# Patient Record
Sex: MALE | Race: WHITE | Employment: UNEMPLOYED | RURAL
[De-identification: names, ages, dates, MRNs, and addresses within clinical notes are randomized per-mention and may not be internally consistent; named-entity substitution may affect disease eponyms.]

---

## 2019-07-25 ENCOUNTER — OFFICE VISIT (OUTPATIENT)
Dept: FAMILY MEDICINE CLINIC | Age: 69
End: 2019-07-25

## 2019-07-25 VITALS
HEART RATE: 57 BPM | RESPIRATION RATE: 16 BRPM | SYSTOLIC BLOOD PRESSURE: 162 MMHG | TEMPERATURE: 98.2 F | OXYGEN SATURATION: 94 % | WEIGHT: 231 LBS | DIASTOLIC BLOOD PRESSURE: 106 MMHG | BODY MASS INDEX: 31.29 KG/M2 | HEIGHT: 72 IN

## 2019-07-25 DIAGNOSIS — E78.2 MIXED HYPERLIPIDEMIA: ICD-10-CM

## 2019-07-25 DIAGNOSIS — I10 ESSENTIAL HYPERTENSION: Primary | ICD-10-CM

## 2019-07-25 DIAGNOSIS — R35.1 NOCTURIA: ICD-10-CM

## 2019-07-25 RX ORDER — RANITIDINE 150 MG/1
150 TABLET, FILM COATED ORAL 2 TIMES DAILY
COMMUNITY

## 2019-07-25 NOTE — PATIENT INSTRUCTIONS
Home Blood Pressure Test: About This Test  What is it? A home blood pressure test allows you to keep track of your blood pressure at home. Blood pressure is a measure of the force of blood against the walls of your arteries. Blood pressure readings include two numbers, such as 130/80 (say \"130 over 80\"). The first number is the systolic pressure. The second number is the diastolic pressure. Why is this test done? You may do this test at home to:  · Find out if you have high blood pressure. · Track your blood pressure if you have high blood pressure. · Track how well medicine is working to reduce high blood pressure. · Check how lifestyle changes, such as weight loss and exercise, are affecting blood pressure. How can you prepare for the test?  · Do not use caffeine, tobacco, or medicines known to raise blood pressure (such as nasal decongestant sprays) for at least 30 minutes before taking your blood pressure. · Do not exercise for at least 30 minutes before taking your blood pressure. What happens before the test?  Take your blood pressure while you feel comfortable and relaxed. Sit quietly with both feet on the floor for at least 5 minutes before the test.  What happens during the test?  · Sit with your arm slightly bent and resting on a table so that your upper arm is at the same level as your heart. · Roll up your sleeve or take off your shirt to expose your upper arm. · Wrap the blood pressure cuff around your upper arm so that the lower edge of the cuff is about 1 inch above the bend of your elbow. Proceed with the following steps depending on if you are using an automatic or manual pressure monitor. Automatic blood pressure monitors  · Press the on/off button on the automatic monitor and wait until the ready-to-measure \"heart\" symbol appears next to zero in the display window. · Press the start button. The cuff will inflate and deflate by itself.   · Your blood pressure numbers will appear on the screen. · Write your numbers in your log book, along with the date and time. Manual blood pressure monitors  · Place the earpieces of a stethoscope in your ears, and place the bell of the stethoscope over the artery, just below the cuff. · Close the valve on the rubber inflating bulb. · Squeeze the bulb rapidly with your opposite hand to inflate the cuff until the dial or column of mercury reads about 30 mm Hg higher than your usual systolic pressure. If you do not know your usual pressure, inflate the cuff to 210 mm Hg or until the pulse at your wrist disappears. · Open the pressure valve just slightly by twisting or pressing the valve on the bulb. · As you watch the pressure slowly fall, note the level on the dial at which you first start to hear a pulsing or tapping sound through the stethoscope. This is your systolic blood pressure. · Continue letting the air out slowly. The sounds will become muffled and will finally disappear. Note the pressure when the sounds completely disappear. This is your diastolic blood pressure. Let out all the remaining air. · Write your numbers in your log book, along with the date and time. What else should you know about the test?  It is more accurate to take the average of several readings made throughout the day than to rely on a single reading. It's normal for blood pressure to go up and down throughout the day. Follow-up care is a key part of your treatment and safety. Be sure to make and go to all appointments, and call your doctor if you are having problems. It's also a good idea to keep a list of the medicines you take. Where can you learn more? Go to http://bijan-miller.info/. Enter C427 in the search box to learn more about \"Home Blood Pressure Test: About This Test.\"  Current as of: July 22, 2018  Content Version: 12.1  © 0186-0548 [employer], [employer].  Care instructions adapted under license by [employer] (which disclaims liability or warranty for this information). If you have questions about a medical condition or this instruction, always ask your healthcare professional. [address] any warranty or liability for your use of this information.

## 2019-07-25 NOTE — PROGRESS NOTES
Patient: Nirav Samuels MRN: [id number]  SSN: [id number]    YOB: 1950  Age: 81 y.o. Sex: male        Subjective:     Chief Complaint   Patient presents with    Establish Care    Sinus Pain     with ear pain    Dizziness       HPI: he is a 81y.o. year old male who presents with wife to establish care. He had been seeing his prior PCP  for annual exams and acute problems. Last OV with PCP 1 year ago. Patient concerned about dizziness with sinus and ear pain. BP uncontrolled. Per wife today's readings are typical for him. Patient on atenolol 50 mg BID. Review of prior OV notes indicate intolerance of other BP medications. Cholesterol uncontrolled. 10 year CV risk calculated at 40%. He has not been on medication for HLD. Patient says he had cardiac testing in [address] [place] [address] and \"came through with flying colors\" and is adamant about his good health. Encounter Diagnoses   Name Primary?  Essential hypertension Yes    Mixed hyperlipidemia     Nocturia        BP Readings from Last 3 Encounters:   07/25/19 (!) 162/106   08/30/18 160/85   06/19/18 138/70       Wt Readings from Last 3 Encounters:   07/25/19 231 lb (104.8 kg)   08/30/18 218 lb (98.9 kg)   06/19/18 223 lb (101.2 kg)     Body mass index is 31.77 kg/m². Current and past medical information:    Current Medications after this visit[de-identified]     Current Outpatient Medications   Medication Sig    raNITIdine (ZANTAC) 150 mg tablet Take 150 mg by mouth two (2) times a day. PRN    atenolol (TENORMIN) 50 mg tablet TAKE 1 TABLET TWICE DAILY     No current facility-administered medications for this visit.         Patient Active Problem List    Diagnosis Date Noted    Essential hypertension 12/08/2015    Asthma        Past Medical History:   Diagnosis Date    Asthma     Contact dermatitis and other eczema, due to unspecified cause     HTN (hypertension) 2/15/2015       Allergies   Allergen Reactions    Prednisone Anaphylaxis       History reviewed. No pertinent surgical history. Social History     Socioeconomic History    Marital status:      Spouse name: Not on file    Number of children: Not on file    Years of education: Not on file    Highest education level: Not on file   Tobacco Use    Smoking status: Former Smoker    Smokeless tobacco: Never Used   Substance and Sexual Activity    Alcohol use: No    Drug use: No         Objective:     Review of Systems:  Constitutional: Negative for fatigue or malaise  Derm: Negative for rash or lesion  HEENT: see HPI  Cardiovascular: Negative for dizziness, chest pain or palpitations  Respiratory: Negative for cough, wheezing or SOB  Gastrointestinal: hx of GERD, Negative for nausea or abdominal pain  Genital/urinary: Negative for dysuria or voiding dysfunction  Musculoskeletal: Negative for acute myalgias or arthralgias   Neurological: Negative for headache, weakness or paresthesia  Psychological: Negative for depression or anxiety      Vitals:    07/25/19 1323 07/25/19 1342   BP: (!) 170/95 (!) 162/106   Pulse: (!) 51 (!) 57   Resp: 16    Temp: 98.2 °F (36.8 °C)    TempSrc: Oral    SpO2: 94%    Weight: 231 lb (104.8 kg)    Height: 5' 11.5\" (1.816 m)       Body mass index is 31.77 kg/m². Physical Exam:  Constitutional: well developed, well nourished, in no acute distress  Skin: warm and dry, normal tone and turgor  Head: normocephalic, atraumatic  Eyes: sclera clear, EOMI  Neck: normal range of motion  Cardiovascular: normal S1, S2, regular rate and rhythm  Respiratory: clear to auscultation bilaterally with symmetrical effort  Abdomen: soft, BS normal  Extremities: full range of motion  Neurology: no focal deficits  Psych: active, alert and oriented, affect appropriate       Assessment and orders:       ICD-10-CM ICD-9-CM    1. Essential hypertension N81 365.9 METABOLIC PANEL, COMPREHENSIVE      TSH 3RD GENERATION   2.  Mixed hyperlipidemia E78.2 272.2 LIPID PANEL      METABOLIC PANEL, COMPREHENSIVE   3. Nocturia R35.1 788.43 PSA, DIAGNOSTIC (PROSTATE SPECIFIC AG)         Plan of care:  Diagnoses were discussed in detail with patient. Patient advised to log blood pressures at home daily and bring to office in 2-4 weeks. Call office as soon as possible if BP's over 140/90 on multiple occasions or with symptoms of dizziness, chest pain, shortness of breath, headache or ankle swelling. Our goal is to normalize the blood pressure to decrease the risks of strokes and heart attacks. Medication risks/benefits/side effects discussed with patient. All of the patient's questions were addressed and answered to apparent satisfaction. The patient understands and agrees with our plan of care. The patient knows to call back if they have questions about the plan of care or if symptoms change. The patient received an After-Visit Summary which contains VS, diagnoses, orders, allergy and medication lists. Patient Care Team:  Colt Hou MD as PCP - [facility])    Follow-up and Dispositions    · Return in about 6 months (around 1/25/2020), or if symptoms worsen or fail to improve. No future appointments.     Signed By: Zoila Garcia MD     July 29, 2019

## 2019-07-25 NOTE — PROGRESS NOTES
1. Have you been to the ER, urgent care clinic since your last visit? Hospitalized since your last visit? No    2. Have you seen or consulted any other health care providers outside of the [address] [place] [address] since your last visit? Include any pap smears or colon screening.  No  Reviewed record in preparation for visit and have necessary documentation  Pt did not bring medication to office visit for review    Goals that were addressed and/or need to be completed during or after this appointment include     Health Maintenance Due   Topic Date Due    DTaP/Tdap/Td series (1 - Tdap) 07/17/1973    Shingrix Vaccine Age 50> (1 of 2) 07/17/2002    GLAUCOMA SCREENING Q2Y  07/17/2017    Pneumococcal 65+ years (1 of 2 - PCV13) 07/17/2017

## 2019-08-01 DIAGNOSIS — R35.1 NOCTURIA: ICD-10-CM

## 2019-08-01 DIAGNOSIS — I10 ESSENTIAL HYPERTENSION: ICD-10-CM

## 2019-08-01 DIAGNOSIS — E78.2 MIXED HYPERLIPIDEMIA: ICD-10-CM

## 2019-08-27 LAB
ALBUMIN SERPL-MCNC: 4.4 G/DL (ref 3.6–4.8)
ALBUMIN/GLOB SERPL: 1.8 {RATIO} (ref 1.2–2.2)
ALP SERPL-CCNC: 62 IU/L (ref 39–117)
ALT SERPL-CCNC: 23 IU/L (ref 0–44)
AST SERPL-CCNC: 20 IU/L (ref 0–40)
BILIRUB SERPL-MCNC: 0.9 MG/DL (ref 0–1.2)
BUN SERPL-MCNC: 15 MG/DL (ref 8–27)
BUN/CREAT SERPL: 14 (ref 10–24)
CALCIUM SERPL-MCNC: 9.4 MG/DL (ref 8.6–10.2)
CHLORIDE SERPL-SCNC: 101 MMOL/L (ref 96–106)
CHOLEST SERPL-MCNC: 221 MG/DL (ref 100–199)
CO2 SERPL-SCNC: 25 MMOL/L (ref 20–29)
CREAT SERPL-MCNC: 1.09 MG/DL (ref 0.76–1.27)
GLOBULIN SER CALC-MCNC: 2.5 G/DL (ref 1.5–4.5)
GLUCOSE SERPL-MCNC: 113 MG/DL (ref 65–99)
HDLC SERPL-MCNC: 48 MG/DL
LDLC SERPL CALC-MCNC: 148 MG/DL (ref 0–99)
POTASSIUM SERPL-SCNC: 3.8 MMOL/L (ref 3.5–5.2)
PROT SERPL-MCNC: 6.9 G/DL (ref 6–8.5)
PSA SERPL-MCNC: 1 NG/ML (ref 0–4)
SODIUM SERPL-SCNC: 142 MMOL/L (ref 134–144)
TRIGL SERPL-MCNC: 127 MG/DL (ref 0–149)
TSH SERPL DL<=0.005 MIU/L-ACNC: 4.2 UIU/ML (ref 0.45–4.5)
VLDLC SERPL CALC-MCNC: 25 MG/DL (ref 5–40)

## 2020-07-14 ENCOUNTER — HOSPITAL ENCOUNTER (INPATIENT)
Age: 70
LOS: 2 days | Discharge: LEFT BEFORE DISCHARGE | DRG: 065 | End: 2020-07-16
Attending: ANESTHESIOLOGY | Admitting: ANESTHESIOLOGY
Payer: MEDICARE

## 2020-07-14 ENCOUNTER — APPOINTMENT (OUTPATIENT)
Dept: CT IMAGING | Age: 70
DRG: 065 | End: 2020-07-14
Payer: MEDICARE

## 2020-07-14 DIAGNOSIS — I61.8 OTHER LEFT-SIDED NONTRAUMATIC INTRACEREBRAL HEMORRHAGE (HCC): ICD-10-CM

## 2020-07-14 DIAGNOSIS — I62.01 NONTRAUMATIC ACUTE SUBDURAL HEMORRHAGE (HCC): ICD-10-CM

## 2020-07-14 DIAGNOSIS — D75.839 THROMBOCYTOSIS: ICD-10-CM

## 2020-07-14 DIAGNOSIS — I10 ESSENTIAL HYPERTENSION: ICD-10-CM

## 2020-07-14 PROBLEM — I61.9 ICH (INTRACEREBRAL HEMORRHAGE) (HCC): Status: ACTIVE | Noted: 2020-07-14

## 2020-07-14 LAB
APTT PPP: 30.4 SEC (ref 22.1–32)
INR PPP: 1.4 (ref 0.9–1.1)
PROTHROMBIN TIME: 13.9 SEC (ref 9–11.1)
THERAPEUTIC RANGE,PTTT: NORMAL SECS (ref 58–77)

## 2020-07-14 PROCEDURE — 85730 THROMBOPLASTIN TIME PARTIAL: CPT

## 2020-07-14 PROCEDURE — 70498 CT ANGIOGRAPHY NECK: CPT

## 2020-07-14 PROCEDURE — 83735 ASSAY OF MAGNESIUM: CPT

## 2020-07-14 PROCEDURE — 74011000250 HC RX REV CODE- 250: Performed by: NURSE PRACTITIONER

## 2020-07-14 PROCEDURE — 80061 LIPID PANEL: CPT

## 2020-07-14 PROCEDURE — 74011000258 HC RX REV CODE- 258: Performed by: RADIOLOGY

## 2020-07-14 PROCEDURE — 74011636320 HC RX REV CODE- 636/320: Performed by: RADIOLOGY

## 2020-07-14 PROCEDURE — 84100 ASSAY OF PHOSPHORUS: CPT

## 2020-07-14 PROCEDURE — 70496 CT ANGIOGRAPHY HEAD: CPT

## 2020-07-14 PROCEDURE — 85025 COMPLETE CBC W/AUTO DIFF WBC: CPT

## 2020-07-14 PROCEDURE — 85610 PROTHROMBIN TIME: CPT

## 2020-07-14 PROCEDURE — 36415 COLL VENOUS BLD VENIPUNCTURE: CPT

## 2020-07-14 PROCEDURE — 70450 CT HEAD/BRAIN W/O DYE: CPT

## 2020-07-14 PROCEDURE — 74011250636 HC RX REV CODE- 250/636: Performed by: NURSE PRACTITIONER

## 2020-07-14 PROCEDURE — 65610000006 HC RM INTENSIVE CARE

## 2020-07-14 PROCEDURE — 80053 COMPREHEN METABOLIC PANEL: CPT

## 2020-07-14 PROCEDURE — 82728 ASSAY OF FERRITIN: CPT

## 2020-07-14 RX ORDER — SODIUM CHLORIDE 0.9 % (FLUSH) 0.9 %
5-40 SYRINGE (ML) INJECTION AS NEEDED
Status: DISCONTINUED | OUTPATIENT
Start: 2020-07-14 | End: 2020-07-16 | Stop reason: HOSPADM

## 2020-07-14 RX ORDER — ACETAMINOPHEN 325 MG/1
650 TABLET ORAL
Status: DISCONTINUED | OUTPATIENT
Start: 2020-07-14 | End: 2020-07-16 | Stop reason: HOSPADM

## 2020-07-14 RX ORDER — HYDRALAZINE HYDROCHLORIDE 20 MG/ML
10 INJECTION INTRAMUSCULAR; INTRAVENOUS
Status: DISCONTINUED | OUTPATIENT
Start: 2020-07-14 | End: 2020-07-15

## 2020-07-14 RX ORDER — SODIUM CHLORIDE 0.9 % (FLUSH) 0.9 %
5-40 SYRINGE (ML) INJECTION EVERY 8 HOURS
Status: DISCONTINUED | OUTPATIENT
Start: 2020-07-14 | End: 2020-07-16 | Stop reason: HOSPADM

## 2020-07-14 RX ORDER — SODIUM CHLORIDE 0.9 % (FLUSH) 0.9 %
10 SYRINGE (ML) INJECTION
Status: COMPLETED | OUTPATIENT
Start: 2020-07-14 | End: 2020-07-14

## 2020-07-14 RX ORDER — ACETAMINOPHEN 650 MG/1
650 SUPPOSITORY RECTAL
Status: DISCONTINUED | OUTPATIENT
Start: 2020-07-14 | End: 2020-07-16 | Stop reason: HOSPADM

## 2020-07-14 RX ORDER — LABETALOL HYDROCHLORIDE 5 MG/ML
10 INJECTION, SOLUTION INTRAVENOUS
Status: DISCONTINUED | OUTPATIENT
Start: 2020-07-14 | End: 2020-07-15

## 2020-07-14 RX ORDER — SODIUM CHLORIDE 9 MG/ML
75 INJECTION, SOLUTION INTRAVENOUS CONTINUOUS
Status: DISCONTINUED | OUTPATIENT
Start: 2020-07-14 | End: 2020-07-16 | Stop reason: HOSPADM

## 2020-07-14 RX ORDER — ONDANSETRON 2 MG/ML
4 INJECTION INTRAMUSCULAR; INTRAVENOUS
Status: DISCONTINUED | OUTPATIENT
Start: 2020-07-14 | End: 2020-07-16 | Stop reason: HOSPADM

## 2020-07-14 RX ADMIN — SODIUM CHLORIDE 7 MG/HR: 900 INJECTION, SOLUTION INTRAVENOUS at 20:50

## 2020-07-14 RX ADMIN — IOPAMIDOL 100 ML: 755 INJECTION, SOLUTION INTRAVENOUS at 21:40

## 2020-07-14 RX ADMIN — SODIUM CHLORIDE 100 ML: 900 INJECTION, SOLUTION INTRAVENOUS at 21:41

## 2020-07-14 RX ADMIN — Medication 10 ML: at 21:10

## 2020-07-14 RX ADMIN — SODIUM CHLORIDE 75 ML/HR: 900 INJECTION, SOLUTION INTRAVENOUS at 21:08

## 2020-07-14 RX ADMIN — Medication 10 ML: at 21:41

## 2020-07-14 NOTE — ROUTINE PROCESS
TRANSFER - IN REPORT:    Verbal report received from Enedelia Garcia RN on Sophie Sen  being received from [facility] for urgent transfer      Report consisted of patients Situation, Background, Assessment and   Recommendations(SBAR). Information from the following report(s) SBAR, Kardex, Recent Results and Cardiac Rhythm NSR was reviewed with the receiving nurse. Opportunity for questions and clarification was provided. Assessment completed upon patients arrival to unit and care assumed. 1956: Patient arrive to unit. Cardene gtt infusing 7 mg/hr. VSS, A&O x4, neuro intact, moves extremities x4, PERRL. 2000: Bedside and Verbal shift change report given to Judy Escobedo RN (oncoming nurse) by Wesly Beck RN (offgoing nurse). Report included the following information SBAR, Kardex, Intake/Output, MAR, Recent Results and Cardiac Rhythm NSR.

## 2020-07-15 ENCOUNTER — APPOINTMENT (OUTPATIENT)
Dept: INTERVENTIONAL RADIOLOGY/VASCULAR | Age: 70
DRG: 065 | End: 2020-07-15
Attending: STUDENT IN AN ORGANIZED HEALTH CARE EDUCATION/TRAINING PROGRAM
Payer: MEDICARE

## 2020-07-15 ENCOUNTER — APPOINTMENT (OUTPATIENT)
Dept: ULTRASOUND IMAGING | Age: 70
DRG: 065 | End: 2020-07-15
Attending: INTERNAL MEDICINE
Payer: MEDICARE

## 2020-07-15 ENCOUNTER — APPOINTMENT (OUTPATIENT)
Dept: MRI IMAGING | Age: 70
DRG: 065 | End: 2020-07-15
Attending: NURSE PRACTITIONER
Payer: MEDICARE

## 2020-07-15 PROBLEM — I62.01 NONTRAUMATIC ACUTE SUBDURAL HEMORRHAGE (HCC): Status: ACTIVE | Noted: 2020-07-14

## 2020-07-15 LAB
ALBUMIN SERPL-MCNC: 4 G/DL (ref 3.5–5)
ALBUMIN/GLOB SERPL: 1.3 {RATIO} (ref 1.1–2.2)
ALP SERPL-CCNC: 63 U/L (ref 45–117)
ALT SERPL-CCNC: 36 U/L (ref 12–78)
ANION GAP SERPL CALC-SCNC: 7 MMOL/L (ref 5–15)
ANION GAP SERPL CALC-SCNC: 8 MMOL/L (ref 5–15)
AST SERPL-CCNC: 16 U/L (ref 15–37)
BASOPHILS # BLD: 1.6 K/UL (ref 0–0.1)
BASOPHILS # BLD: 3.1 K/UL (ref 0–0.1)
BASOPHILS NFR BLD: 5 % (ref 0–1)
BASOPHILS NFR BLD: 9 % (ref 0–1)
BILIRUB SERPL-MCNC: 0.5 MG/DL (ref 0.2–1)
BUN SERPL-MCNC: 15 MG/DL (ref 6–20)
BUN SERPL-MCNC: 18 MG/DL (ref 6–20)
BUN/CREAT SERPL: 15 (ref 12–20)
BUN/CREAT SERPL: 16 (ref 12–20)
CALCIUM SERPL-MCNC: 8.4 MG/DL (ref 8.5–10.1)
CALCIUM SERPL-MCNC: 8.5 MG/DL (ref 8.5–10.1)
CHLORIDE SERPL-SCNC: 106 MMOL/L (ref 97–108)
CHLORIDE SERPL-SCNC: 109 MMOL/L (ref 97–108)
CHOLEST SERPL-MCNC: 182 MG/DL
CO2 SERPL-SCNC: 23 MMOL/L (ref 21–32)
CO2 SERPL-SCNC: 26 MMOL/L (ref 21–32)
COMMENT, HOLDF: NORMAL
CREAT SERPL-MCNC: 0.91 MG/DL (ref 0.7–1.3)
CREAT SERPL-MCNC: 1.18 MG/DL (ref 0.7–1.3)
DIFFERENTIAL METHOD BLD: ABNORMAL
DIFFERENTIAL METHOD BLD: ABNORMAL
EOSINOPHIL # BLD: 0.3 K/UL (ref 0–0.4)
EOSINOPHIL # BLD: 1 K/UL (ref 0–0.4)
EOSINOPHIL NFR BLD: 1 % (ref 0–7)
EOSINOPHIL NFR BLD: 3 % (ref 0–7)
ERYTHROCYTE [DISTWIDTH] IN BLOOD BY AUTOMATED COUNT: 14.9 % (ref 11.5–14.5)
ERYTHROCYTE [DISTWIDTH] IN BLOOD BY AUTOMATED COUNT: 15.1 % (ref 11.5–14.5)
FACT VIII ACT/NOR PPP: 109 % (ref 80–200)
FERRITIN SERPL-MCNC: 422 NG/ML (ref 26–388)
FERRITIN SERPL-MCNC: 435 NG/ML (ref 26–388)
GLOBULIN SER CALC-MCNC: 3 G/DL (ref 2–4)
GLUCOSE SERPL-MCNC: 108 MG/DL (ref 65–100)
GLUCOSE SERPL-MCNC: 121 MG/DL (ref 65–100)
HCT VFR BLD AUTO: 38.5 % (ref 36.6–50.3)
HCT VFR BLD AUTO: 40.4 % (ref 36.6–50.3)
HDLC SERPL-MCNC: 33 MG/DL
HDLC SERPL: 5.5 {RATIO} (ref 0–5)
HGB BLD-MCNC: 12.8 G/DL (ref 12.1–17)
HGB BLD-MCNC: 13.3 G/DL (ref 12.1–17)
IMM GRANULOCYTES # BLD AUTO: 0 K/UL
IMM GRANULOCYTES # BLD AUTO: 0 K/UL
IMM GRANULOCYTES NFR BLD AUTO: 0 %
IMM GRANULOCYTES NFR BLD AUTO: 0 %
IRON SATN MFR SERPL: 56 % (ref 20–50)
IRON SERPL-MCNC: 130 UG/DL (ref 35–150)
LDH SERPL L TO P-CCNC: 300 U/L (ref 85–241)
LDLC SERPL CALC-MCNC: 106.6 MG/DL (ref 0–100)
LIPID PROFILE,FLP: ABNORMAL
LYMPHOCYTES # BLD: 2.6 K/UL (ref 0.8–3.5)
LYMPHOCYTES # BLD: 3.4 K/UL (ref 0.8–3.5)
LYMPHOCYTES NFR BLD: 10 % (ref 12–49)
LYMPHOCYTES NFR BLD: 8 % (ref 12–49)
MAGNESIUM SERPL-MCNC: 2 MG/DL (ref 1.6–2.4)
MAGNESIUM SERPL-MCNC: 2 MG/DL (ref 1.6–2.4)
MCH RBC QN AUTO: 29 PG (ref 26–34)
MCH RBC QN AUTO: 29 PG (ref 26–34)
MCHC RBC AUTO-ENTMCNC: 32.9 G/DL (ref 30–36.5)
MCHC RBC AUTO-ENTMCNC: 33.2 G/DL (ref 30–36.5)
MCV RBC AUTO: 87.1 FL (ref 80–99)
MCV RBC AUTO: 88 FL (ref 80–99)
METAMYELOCYTES NFR BLD MANUAL: 1 %
MONOCYTES # BLD: 1.6 K/UL (ref 0–1)
MONOCYTES # BLD: 1.7 K/UL (ref 0–1)
MONOCYTES NFR BLD: 5 % (ref 5–13)
MONOCYTES NFR BLD: 5 % (ref 5–13)
MYELOCYTES NFR BLD MANUAL: 1 %
MYELOCYTES NFR BLD MANUAL: 4 %
NEUTS BAND NFR BLD MANUAL: 4 % (ref 0–6)
NEUTS BAND NFR BLD MANUAL: 6 % (ref 0–6)
NEUTS SEG # BLD: 24.1 K/UL (ref 1.8–8)
NEUTS SEG # BLD: 25 K/UL (ref 1.8–8)
NEUTS SEG NFR BLD: 65 % (ref 32–75)
NEUTS SEG NFR BLD: 73 % (ref 32–75)
NRBC # BLD: 0 K/UL (ref 0–0.01)
NRBC # BLD: 0 K/UL (ref 0–0.01)
NRBC BLD-RTO: 0 PER 100 WBC
NRBC BLD-RTO: 0 PER 100 WBC
PATH REV BLD -IMP: ABNORMAL
PERIPHERAL SMEAR,PSM: NORMAL
PHOSPHATE SERPL-MCNC: 3 MG/DL (ref 2.6–4.7)
PHOSPHATE SERPL-MCNC: 3.6 MG/DL (ref 2.6–4.7)
PLATELET # BLD AUTO: 1517 K/UL (ref 150–400)
PLATELET # BLD AUTO: 1547 K/UL (ref 150–400)
PLATELET COMMENTS,PCOM: ABNORMAL
PLATELET COMMENTS,PCOM: ABNORMAL
PMV BLD AUTO: 10.3 FL (ref 8.9–12.9)
PMV BLD AUTO: 10.5 FL (ref 8.9–12.9)
POTASSIUM SERPL-SCNC: 3.3 MMOL/L (ref 3.5–5.1)
POTASSIUM SERPL-SCNC: 3.7 MMOL/L (ref 3.5–5.1)
PROT SERPL-MCNC: 7 G/DL (ref 6.4–8.2)
RBC # BLD AUTO: 4.42 M/UL (ref 4.1–5.7)
RBC # BLD AUTO: 4.59 M/UL (ref 4.1–5.7)
RBC MORPH BLD: ABNORMAL
RBC MORPH BLD: ABNORMAL
SAMPLES BEING HELD,HOLD: NORMAL
SODIUM SERPL-SCNC: 139 MMOL/L (ref 136–145)
SODIUM SERPL-SCNC: 140 MMOL/L (ref 136–145)
TIBC SERPL-MCNC: 232 UG/DL (ref 250–450)
TRIGL SERPL-MCNC: 212 MG/DL (ref ?–150)
VLDLC SERPL CALC-MCNC: 42.4 MG/DL
WBC # BLD AUTO: 32.5 K/UL (ref 4.1–11.1)
WBC # BLD AUTO: 33.9 K/UL (ref 4.1–11.1)

## 2020-07-15 PROCEDURE — 74011636320 HC RX REV CODE- 636/320: Performed by: STUDENT IN AN ORGANIZED HEALTH CARE EDUCATION/TRAINING PROGRAM

## 2020-07-15 PROCEDURE — 74011250636 HC RX REV CODE- 250/636: Performed by: NURSE PRACTITIONER

## 2020-07-15 PROCEDURE — A9585 GADOBUTROL INJECTION: HCPCS | Performed by: ANESTHESIOLOGY

## 2020-07-15 PROCEDURE — 81479 UNLISTED MOLECULAR PATHOLOGY: CPT

## 2020-07-15 PROCEDURE — 70553 MRI BRAIN STEM W/O & W/DYE: CPT

## 2020-07-15 PROCEDURE — 73060999999 HC MISC LAB CHARGE

## 2020-07-15 PROCEDURE — B31F1ZZ FLUOROSCOPY OF LEFT VERTEBRAL ARTERY USING LOW OSMOLAR CONTRAST: ICD-10-PCS | Performed by: STUDENT IN AN ORGANIZED HEALTH CARE EDUCATION/TRAINING PROGRAM

## 2020-07-15 PROCEDURE — 85246 CLOT FACTOR VIII VW ANTIGEN: CPT

## 2020-07-15 PROCEDURE — C1769 GUIDE WIRE: HCPCS

## 2020-07-15 PROCEDURE — 83615 LACTATE (LD) (LDH) ENZYME: CPT

## 2020-07-15 PROCEDURE — 85240 CLOT FACTOR VIII AHG 1 STAGE: CPT

## 2020-07-15 PROCEDURE — B3151ZZ FLUOROSCOPY OF BILATERAL COMMON CAROTID ARTERIES USING LOW OSMOLAR CONTRAST: ICD-10-PCS | Performed by: STUDENT IN AN ORGANIZED HEALTH CARE EDUCATION/TRAINING PROGRAM

## 2020-07-15 PROCEDURE — 76937 US GUIDE VASCULAR ACCESS: CPT

## 2020-07-15 PROCEDURE — 74011250636 HC RX REV CODE- 250/636: Performed by: ANESTHESIOLOGY

## 2020-07-15 PROCEDURE — B3171ZZ FLUOROSCOPY OF LEFT INTERNAL CAROTID ARTERY USING LOW OSMOLAR CONTRAST: ICD-10-PCS | Performed by: STUDENT IN AN ORGANIZED HEALTH CARE EDUCATION/TRAINING PROGRAM

## 2020-07-15 PROCEDURE — 82728 ASSAY OF FERRITIN: CPT

## 2020-07-15 PROCEDURE — 81270 JAK2 GENE: CPT

## 2020-07-15 PROCEDURE — 83735 ASSAY OF MAGNESIUM: CPT

## 2020-07-15 PROCEDURE — 76700 US EXAM ABDOM COMPLETE: CPT

## 2020-07-15 PROCEDURE — B31B1ZZ FLUOROSCOPY OF LEFT EXTERNAL CAROTID ARTERY USING LOW OSMOLAR CONTRAST: ICD-10-PCS | Performed by: STUDENT IN AN ORGANIZED HEALTH CARE EDUCATION/TRAINING PROGRAM

## 2020-07-15 PROCEDURE — 81206 BCR/ABL1 GENE MAJOR BP: CPT

## 2020-07-15 PROCEDURE — 80048 BASIC METABOLIC PNL TOTAL CA: CPT

## 2020-07-15 PROCEDURE — 83540 ASSAY OF IRON: CPT

## 2020-07-15 PROCEDURE — 85245 CLOT FACTOR VIII VW RISTOCTN: CPT

## 2020-07-15 PROCEDURE — 74011250636 HC RX REV CODE- 250/636

## 2020-07-15 PROCEDURE — 99255 IP/OBS CONSLTJ NEW/EST HI 80: CPT | Performed by: STUDENT IN AN ORGANIZED HEALTH CARE EDUCATION/TRAINING PROGRAM

## 2020-07-15 PROCEDURE — 85025 COMPLETE CBC W/AUTO DIFF WBC: CPT

## 2020-07-15 PROCEDURE — 77030008584 HC TOOL GDWRE DEV TERU -A

## 2020-07-15 PROCEDURE — 74011000258 HC RX REV CODE- 258: Performed by: ANESTHESIOLOGY

## 2020-07-15 PROCEDURE — 74011250637 HC RX REV CODE- 250/637: Performed by: NURSE PRACTITIONER

## 2020-07-15 PROCEDURE — 70546 MR ANGIOGRAPH HEAD W/O&W/DYE: CPT

## 2020-07-15 PROCEDURE — 88237 TISSUE CULTURE BONE MARROW: CPT

## 2020-07-15 PROCEDURE — 74011250636 HC RX REV CODE- 250/636: Performed by: STUDENT IN AN ORGANIZED HEALTH CARE EDUCATION/TRAINING PROGRAM

## 2020-07-15 PROCEDURE — 82668 ASSAY OF ERYTHROPOIETIN: CPT

## 2020-07-15 PROCEDURE — 88264 CHROMOSOME ANALYSIS 20-25: CPT

## 2020-07-15 PROCEDURE — 36415 COLL VENOUS BLD VENIPUNCTURE: CPT

## 2020-07-15 PROCEDURE — 74011000250 HC RX REV CODE- 250: Performed by: NURSE PRACTITIONER

## 2020-07-15 PROCEDURE — 84100 ASSAY OF PHOSPHORUS: CPT

## 2020-07-15 PROCEDURE — 74011000250 HC RX REV CODE- 250

## 2020-07-15 PROCEDURE — C1760 CLOSURE DEV, VASC: HCPCS

## 2020-07-15 PROCEDURE — 77030012468 HC VLV BLEEDBK CNTRL ABBT -B

## 2020-07-15 PROCEDURE — 65660000000 HC RM CCU STEPDOWN

## 2020-07-15 PROCEDURE — 77030021532 HC CATH ANGI DX IMPRS MRTM -B

## 2020-07-15 RX ORDER — POTASSIUM CHLORIDE 750 MG/1
40 TABLET, FILM COATED, EXTENDED RELEASE ORAL
Status: COMPLETED | OUTPATIENT
Start: 2020-07-15 | End: 2020-07-15

## 2020-07-15 RX ORDER — FLUMAZENIL 0.1 MG/ML
0.5 INJECTION INTRAVENOUS ONCE
Status: DISCONTINUED | OUTPATIENT
Start: 2020-07-15 | End: 2020-07-15 | Stop reason: HOSPADM

## 2020-07-15 RX ORDER — FENTANYL CITRATE 50 UG/ML
100 INJECTION, SOLUTION INTRAMUSCULAR; INTRAVENOUS
Status: DISCONTINUED | OUTPATIENT
Start: 2020-07-15 | End: 2020-07-15 | Stop reason: HOSPADM

## 2020-07-15 RX ORDER — MIDAZOLAM HYDROCHLORIDE 1 MG/ML
1 INJECTION, SOLUTION INTRAMUSCULAR; INTRAVENOUS
Status: DISCONTINUED | OUTPATIENT
Start: 2020-07-15 | End: 2020-07-15

## 2020-07-15 RX ORDER — LABETALOL HYDROCHLORIDE 5 MG/ML
20 INJECTION, SOLUTION INTRAVENOUS
Status: DISCONTINUED | OUTPATIENT
Start: 2020-07-15 | End: 2020-07-16 | Stop reason: HOSPADM

## 2020-07-15 RX ORDER — LIDOCAINE HYDROCHLORIDE 20 MG/ML
INJECTION, SOLUTION INFILTRATION; PERINEURAL
Status: COMPLETED
Start: 2020-07-15 | End: 2020-07-15

## 2020-07-15 RX ORDER — LIDOCAINE HYDROCHLORIDE 20 MG/ML
20 INJECTION, SOLUTION EPIDURAL; INFILTRATION; INTRACAUDAL; PERINEURAL ONCE
Status: DISCONTINUED | OUTPATIENT
Start: 2020-07-15 | End: 2020-07-15 | Stop reason: HOSPADM

## 2020-07-15 RX ORDER — SODIUM CHLORIDE 9 MG/ML
25 INJECTION, SOLUTION INTRAVENOUS CONTINUOUS
Status: DISCONTINUED | OUTPATIENT
Start: 2020-07-15 | End: 2020-07-15 | Stop reason: HOSPADM

## 2020-07-15 RX ORDER — NALOXONE HYDROCHLORIDE 0.4 MG/ML
0.4 INJECTION, SOLUTION INTRAMUSCULAR; INTRAVENOUS; SUBCUTANEOUS AS NEEDED
Status: DISCONTINUED | OUTPATIENT
Start: 2020-07-15 | End: 2020-07-15 | Stop reason: HOSPADM

## 2020-07-15 RX ORDER — POTASSIUM CHLORIDE 750 MG/1
20 TABLET, FILM COATED, EXTENDED RELEASE ORAL DAILY
Status: COMPLETED | OUTPATIENT
Start: 2020-07-15 | End: 2020-07-15

## 2020-07-15 RX ORDER — BUTALBITAL, ACETAMINOPHEN AND CAFFEINE 50; 325; 40 MG/1; MG/1; MG/1
1-2 TABLET ORAL
Status: DISCONTINUED | OUTPATIENT
Start: 2020-07-15 | End: 2020-07-16 | Stop reason: HOSPADM

## 2020-07-15 RX ORDER — HYDRALAZINE HYDROCHLORIDE 20 MG/ML
20 INJECTION INTRAMUSCULAR; INTRAVENOUS
Status: DISCONTINUED | OUTPATIENT
Start: 2020-07-15 | End: 2020-07-16 | Stop reason: HOSPADM

## 2020-07-15 RX ORDER — MIDAZOLAM HYDROCHLORIDE 1 MG/ML
INJECTION, SOLUTION INTRAMUSCULAR; INTRAVENOUS
Status: COMPLETED
Start: 2020-07-15 | End: 2020-07-15

## 2020-07-15 RX ORDER — MIDAZOLAM HYDROCHLORIDE 1 MG/ML
5 INJECTION, SOLUTION INTRAMUSCULAR; INTRAVENOUS
Status: DISCONTINUED | OUTPATIENT
Start: 2020-07-15 | End: 2020-07-15 | Stop reason: HOSPADM

## 2020-07-15 RX ORDER — FENTANYL CITRATE 50 UG/ML
INJECTION, SOLUTION INTRAMUSCULAR; INTRAVENOUS
Status: COMPLETED
Start: 2020-07-15 | End: 2020-07-15

## 2020-07-15 RX ORDER — ATENOLOL 25 MG/1
100 TABLET ORAL 2 TIMES DAILY
Status: DISCONTINUED | OUTPATIENT
Start: 2020-07-15 | End: 2020-07-15

## 2020-07-15 RX ORDER — METOCLOPRAMIDE HYDROCHLORIDE 5 MG/ML
5 INJECTION INTRAMUSCULAR; INTRAVENOUS
Status: DISCONTINUED | OUTPATIENT
Start: 2020-07-15 | End: 2020-07-16 | Stop reason: HOSPADM

## 2020-07-15 RX ADMIN — SODIUM CHLORIDE 100 ML: 900 INJECTION, SOLUTION INTRAVENOUS at 02:35

## 2020-07-15 RX ADMIN — GADOBUTROL 10 ML: 604.72 INJECTION INTRAVENOUS at 03:00

## 2020-07-15 RX ADMIN — HEPARIN SODIUM 4000 UNITS: 1000 INJECTION INTRAVENOUS; SUBCUTANEOUS at 08:57

## 2020-07-15 RX ADMIN — LIDOCAINE HYDROCHLORIDE 10 ML: 20 INJECTION, SOLUTION INFILTRATION; PERINEURAL at 08:25

## 2020-07-15 RX ADMIN — SODIUM CHLORIDE 75 ML/HR: 900 INJECTION, SOLUTION INTRAVENOUS at 20:05

## 2020-07-15 RX ADMIN — FENTANYL CITRATE 25 MCG: 50 INJECTION INTRAMUSCULAR; INTRAVENOUS at 08:17

## 2020-07-15 RX ADMIN — POTASSIUM CHLORIDE 40 MEQ: 750 TABLET, FILM COATED, EXTENDED RELEASE ORAL at 03:17

## 2020-07-15 RX ADMIN — IOPAMIDOL 100 ML: 612 INJECTION, SOLUTION INTRAVENOUS at 09:03

## 2020-07-15 RX ADMIN — SODIUM CHLORIDE 4.5 MG/HR: 900 INJECTION, SOLUTION INTRAVENOUS at 04:45

## 2020-07-15 RX ADMIN — FENTANYL CITRATE 50 MCG: 50 INJECTION INTRAMUSCULAR; INTRAVENOUS at 09:00

## 2020-07-15 RX ADMIN — METOCLOPRAMIDE 5 MG: 5 INJECTION, SOLUTION INTRAMUSCULAR; INTRAVENOUS at 03:18

## 2020-07-15 RX ADMIN — HEPARIN SODIUM 4000 UNITS: 1000 INJECTION INTRAVENOUS; SUBCUTANEOUS at 08:55

## 2020-07-15 RX ADMIN — BUTALBITAL, ACETAMINOPHEN, AND CAFFEINE 2 TABLET: 50; 325; 40 TABLET ORAL at 03:17

## 2020-07-15 RX ADMIN — BUTALBITAL, ACETAMINOPHEN, AND CAFFEINE 2 TABLET: 50; 325; 40 TABLET ORAL at 22:17

## 2020-07-15 RX ADMIN — HYDRALAZINE HYDROCHLORIDE 20 MG: 20 INJECTION INTRAMUSCULAR; INTRAVENOUS at 16:01

## 2020-07-15 RX ADMIN — FENTANYL CITRATE 25 MCG: 50 INJECTION INTRAMUSCULAR; INTRAVENOUS at 08:30

## 2020-07-15 RX ADMIN — HEPARIN SODIUM 4000 UNITS: 1000 INJECTION INTRAVENOUS; SUBCUTANEOUS at 08:56

## 2020-07-15 RX ADMIN — MIDAZOLAM HYDROCHLORIDE 1 MG: 1 INJECTION, SOLUTION INTRAMUSCULAR; INTRAVENOUS at 08:17

## 2020-07-15 RX ADMIN — HEPARIN SODIUM 4000 UNITS: 1000 INJECTION INTRAVENOUS; SUBCUTANEOUS at 08:58

## 2020-07-15 RX ADMIN — ONDANSETRON 4 MG: 2 INJECTION INTRAMUSCULAR; INTRAVENOUS at 18:58

## 2020-07-15 RX ADMIN — SODIUM CHLORIDE 75 ML/HR: 900 INJECTION, SOLUTION INTRAVENOUS at 08:17

## 2020-07-15 RX ADMIN — SODIUM CHLORIDE 7 MG/HR: 900 INJECTION, SOLUTION INTRAVENOUS at 00:15

## 2020-07-15 RX ADMIN — Medication 10 ML: at 15:07

## 2020-07-15 RX ADMIN — BUTALBITAL, ACETAMINOPHEN, AND CAFFEINE 2 TABLET: 50; 325; 40 TABLET ORAL at 15:12

## 2020-07-15 RX ADMIN — POTASSIUM CHLORIDE 20 MEQ: 750 TABLET, FILM COATED, EXTENDED RELEASE ORAL at 10:43

## 2020-07-15 NOTE — PROGRESS NOTES
Chart reviewed, consulted with RN, attempted PT evaluation. Patient adamantly refused to attempt any activity at this time despite encouragement and options for session, citing headache. RN notified. Will defer and will f/u tomorrow.    Jony Jernigan, PT, DPT

## 2020-07-15 NOTE — PROGRESS NOTES
Chart reviewed in preparation for PT evaluation. Noted patient is off the floor for procedure. Will defer at this time and will f/u as able and appropriate.     Shobha Russ, PT, DPT

## 2020-07-15 NOTE — CONSULTS
[facility] [place] [facility] [place] [address] [address], [place]: [phone]  F: [phone]    Reason for Visit:   Marian Sales is a 81 y.o. male who is seen in consultation at the request of Binta Willett for evaluation of thrombocytosis    History of Present Illness:   Patient is a 81 y.o. male  With HTN who was admitted on 7/14/2020 with a sudden headache. He had initially presented to [facility] the same day with a 10/10 HA and CT showed a 1.9 cm calcified lesion in the brain with SDH when he requested a transfer to [facility]. He had a CTA neck and MRI brain on admission that was notable for no aneurysms , a left anterior 2 cm meningioma, R parietal 6 mm meningioma and a small SDH. An angiogram showed a small dural branch coming off the left middle meningeal artery supplying a vascular lesion at the level of the left paraclinoid region suggestive of a vascular lesion. No interventions are planned. His HTN is being managed aggressively. We are consulted for extreme thrombocytosis noted on admission with a platelet count of 1392P. Platelets were about 1800k at [facility]. He also had leucocytosis with a WBC count of 33k with 63% PMNs, 10% Lymph, 9% basophils and some myelocytes. He has been healthy up until now. Prior CBC 2018 was normal  No h/o abnormal bleeding and no h/o DVT/PE/ Strokes  Has been uptodate with colonoscopies, does not smoke  He has no fevers, chills, sweats, CP, SOB, falls, rashes, pruritus    His Brother was diagnosed with polycythemia in his 62s and undergoes periodic phlebotomies      Past Medical History:   Diagnosis Date    Asthma     Contact dermatitis and other eczema, due to unspecified cause     HTN (hypertension) 2/15/2015      History reviewed. No pertinent surgical history.    Social History     Tobacco Use    Smoking status: Former Smoker    Smokeless tobacco: Never Used   Substance Use Topics    Alcohol use: No      Family History   Problem Relation Age of Onset    Cancer Maternal Grandmother         colon      Current Facility-Administered Medications   Medication Dose Route Frequency    butalbital-acetaminophen-caffeine (FIORICET, ESGIC) -40 mg per tablet 1-2 Tab  1-2 Tab Oral Q6H PRN    metoclopramide HCl (REGLAN) injection 5 mg  5 mg IntraVENous Q6H PRN    labetaloL (NORMODYNE;TRANDATE) injection 20 mg  20 mg IntraVENous Q4H PRN    Or    hydrALAZINE (APRESOLINE) 20 mg/mL injection 20 mg  20 mg IntraVENous Q4H PRN    niCARdipine (CARDENE) 25 mg in 0.9% sodium chloride 250 mL infusion  0-15 mg/hr IntraVENous TITRATE    sodium chloride (NS) flush 5-40 mL  5-40 mL IntraVENous Q8H    sodium chloride (NS) flush 5-40 mL  5-40 mL IntraVENous PRN    ondansetron (ZOFRAN) injection 4 mg  4 mg IntraVENous Q4H PRN    acetaminophen (TYLENOL) tablet 650 mg  650 mg Oral Q4H PRN    Or    acetaminophen (TYLENOL) solution 650 mg  650 mg Per NG tube Q4H PRN    Or    acetaminophen (TYLENOL) suppository 650 mg  650 mg Rectal Q4H PRN    0.9% sodium chloride infusion  75 mL/hr IntraVENous CONTINUOUS    acetaminophen (TYLENOL) tablet 650 mg  650 mg Oral Q6H PRN      Allergies   Allergen Reactions    Prednisone Anaphylaxis        Review of Systems: A complete review of systems was obtained, negative except as described above. Physical Exam:     Visit Vitals  BP (!) 159/91 Comment: hydralazine given   Pulse 65   Temp 98.2 °F (36.8 °C)   Resp 20   Ht 5' 11\" (1.803 m)   Wt 219 lb 2.2 oz (99.4 kg)   SpO2 96%   BMI 30.56 kg/m²     ECOG PS: 1  General: No distress  Eyes: PERRLA, anicteric sclerae  HENT: Atraumatic, OP clear  Neck: Supple  Lymphatic: No cervical, supraclavicular, or inguinal adenopathy  Respiratory: CTAB, normal respiratory effort  CV: Normal rate, regular rhythm, no murmurs, no peripheral edema  GI: Soft, nontender, nondistended, no masses, no hepatomegaly, no splenomegaly  MS: Normal gait and station.  Digits without clubbing or cyanosis. Skin: No rashes, ecchymoses, or petechiae. Normal temperature, turgor, and texture. Psych: Alert, oriented, appropriate affect, normal judgment/insight    Results:     Lab Results   Component Value Date/Time    WBC 33.9 (H) 07/15/2020 04:58 AM    HGB 12.8 07/15/2020 04:58 AM    HCT 38.5 07/15/2020 04:58 AM    PLATELET 9,556 (HH) 55/07/2606 04:58 AM    MCV 87.1 07/15/2020 04:58 AM    ABS. NEUTROPHILS 24.1 (H) 07/15/2020 04:58 AM    HGB (POC) 16.1 08/30/2018 12:06 PM    HCT (POC) 47.5 08/30/2018 12:06 PM     Lab Results   Component Value Date/Time    Sodium 140 07/15/2020 04:54 AM    Potassium 3.7 07/15/2020 04:54 AM    Chloride 109 (H) 07/15/2020 04:54 AM    CO2 23 07/15/2020 04:54 AM    Glucose 108 (H) 07/15/2020 04:54 AM    BUN 15 07/15/2020 04:54 AM    Creatinine 0.91 07/15/2020 04:54 AM    GFR est AA >60 07/15/2020 04:54 AM    GFR est non-AA >60 07/15/2020 04:54 AM    Calcium 8.4 (L) 07/15/2020 04:54 AM     Lab Results   Component Value Date/Time    Bilirubin, total 0.5 07/14/2020 10:34 PM    ALT (SGPT) 36 07/14/2020 10:34 PM    Alk. phosphatase 63 07/14/2020 10:34 PM    Protein, total 7.0 07/14/2020 10:34 PM    Albumin 4.0 07/14/2020 10:34 PM    Globulin 3.0 07/14/2020 10:34 PM       Lab Results   Component Value Date/Time    Iron % saturation 56 (H) 07/15/2020 01:49 PM    TIBC 232 (L) 07/15/2020 01:49 PM    Ferritin 435 (H) 07/15/2020 01:49 PM     (H) 07/15/2020 01:49 PM    TSH 4.200 08/26/2019 09:06 AM    Hep C Virus Ab <0.1 08/30/2018 11:37 AM     Lab Results   Component Value Date/Time    INR 1.4 (H) 07/14/2020 10:34 PM    aPTT 30.4 07/14/2020 10:34 PM       Records reviewed and summarized above. Pathology report(s) reviewed above. Radiology report(s) reviewed above.   Reviewed MRI, CTA and angiogram    Assessment:   1) Extreme thrombocytosis       No CBC since 2018 when it was normal up until 7/14/2020 where he is noted to have left shifted granulopoiesis with extreme thrombocytosis This may be reactive to acute intracranial bleed given that numbers are trending down in the last 24 hours alone    However a myeloproliferative neoplasm is not r/o  Work up is being sent as below though these will not likely result for 5-7 business days  In the meantime we will assess for any co oexisting hemostatic defects such as acquired vWF deficiency which may be seen with extreme thrombocytosis and may increase the risk of bleeding  If he has an activity level of < 30% I will empirically start him on Hydrea until we have additional test results      2) Leucocytosis - left shifted  Likely reactive but will r/o CML    3) SDH  Discussed with Dr. Della Varela  Though the angio was negative  suspicion for an aneurysm is high - being managed conservatively    4) HTN        Plan:     · Erythropoietin, JAK2, BCR ABL PCR, vWF panel, LD  · NO Aspirin  · USG ABDOMEN to assess for splenomegaly  · If vWF activity is < 30% and platelets tomorrow remain > 1 million will start Hydrea 1000 mg daily while we await results of other tests. If platelets < 1 million tomorrow then will NOT start hydrea  · Cbc diff daily  · Dr. Chay Alejandro to see tomorrow as I am off till 7/19/2020    I appreciate the opportunity to participate in Mr. Hank trujillo.     Signed By: Trell Elder MD

## 2020-07-15 NOTE — PROGRESS NOTES
Occupational Therapy    Chart reviewed, consulted with RN, attempted OT evaluation. Patient adamantly refused to attempt any activity at this time despite encouragement and options for session, citing headache. RN notified. Will defer and will f/u as able and appropriate.     Caty Awad, OTR/L

## 2020-07-15 NOTE — PROGRESS NOTES
Occupational Therapy: defer    Chart reviewed in preparation for OT evaluation. Noted patient is off the floor for procedure. Will defer at this time and will f/u as able and appropriate.     Re Thompson, OTR/L

## 2020-07-15 NOTE — PROGRESS NOTES
Neurointerventional Surgery Post-procedure Note    Patient: Janet Valderrama MRN: [id number]  SSN: [id number]    YOB: 1950  Age: 81 y.o. Sex: male      Admit Date: 7/14/2020    LOS: 1 day     Procedure: Diagnostic cerebral angiogram    Procedure date: 7/15/20    Preoperative dx: rule out intracranial aneurysm    Postoperative dx: No evidence of intracranial aneurysm    EBL: 5 cc    Complications: None    Disposition: ICU    Access: RCFA, 5F sheath    Closure: 6F Angioseal    Sedation: Nursing sedation    Findings: There was no evidence of intracranial aneurysm. MRI brain suggestive of meningioma. The angiogram shows a small dural branch coming off the left middle meningeal artery supplying a vascular lesion at the level of the left paraclinoid region suggestive of a vascular lesion.      Plan:  Neurochecks 1h  Groin checks per protocol  Keep SBP less than 140 mmHg  Neurosurgery evaluation        Юлия Loera MD  NeuroInterventional Surgery

## 2020-07-15 NOTE — CONSULTS
Neuro-Interventional Surgery Consult  Rene Sierra NP    Patient: Janine Dangelo MRN: [id number]  SSN: [id number]    YOB: 1950  Age: 81 y.o. Sex: male      Chief Complaint: Headache    Subjective:      Janine Dangelo is a 81 y.o. male with a pmh of HTN who presented to [facility] in [address] on 7/14/20 reporting a sudden onset of \"the worse headache of my life. \" Pt reports describes the headache came on as a \"boom\" on the left side of his head and radiated down the left side of his neck. He reports he had been doing yardwork earlier in the day and was just finishing up when the headache started. He denies any head trauma. In the OSH ED, a CT of his head was performed which showed a small left tentorial SDH, so he was transferred to [facility] for higher level of care. 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 NIS has been consulted to render an opinion. NSGY was also consulted, with no plans for surgical intervention at this time. Pt does not smoke cigarettes, does not drink alcohol, does not have a family hx of aneurysm/avm that he knows of. He does not have a hx of connective tissue disorders. Presently, he is resting comfortably, he denies any numbness, tingling, nausea, vomiting, or blurred vision. He does report left sided neck pain and stiffness that started when his headache started.       Past Medical History:   Diagnosis Date    Asthma     Contact dermatitis and other eczema, due to unspecified cause     HTN (hypertension) 2/15/2015     Family History   Problem Relation Age of Onset    Cancer Maternal Grandmother         colon      Social History     Tobacco Use    Smoking status: Former Smoker    Smokeless tobacco: Never Used   Substance Use Topics  Alcohol use: No      Prior to Admission Medications   Prescriptions Last Dose Informant Patient Reported? Taking?   amoxicillin-clavulanate (AUGMENTIN) 875-125 mg per tablet   No No   Sig: Take 1 Tab by mouth every twelve (12) hours. atenoloL (TENORMIN) 100 mg tablet   No No   Sig: Take 1 Tab by mouth two (2) times a day. ipratropium (Atrovent) 42 mcg (0.06 %) nasal spray   No No   Si Sprays by Both Nostrils route two (2) times a day. raNITIdine (ZANTAC) 150 mg tablet   Yes No   Sig: Take 150 mg by mouth two (2) times a day. PRN      Facility-Administered Medications: None       Allergies   Allergen Reactions    Prednisone Anaphylaxis     Review of Systems:  Pertinent items are noted in the History of Present Illness. Objective:     Vitals:    20 2000 20 2100 20 2200 20 2300   BP: 128/63 130/82 111/69 108/78   Pulse: 76 70 77 73   Resp: 12 18 17 18   Temp: 98.4 °F (36.9 °C)      SpO2: 95% 95% 94% 94%   Weight:       Height:          Physical Exam:  GENERAL: Calm, cooperative, NAD  SKIN: Warm, dry, color appropriate for ethnicity. Neurologic Exam:  Mental Status:  Alert and oriented x 4. Appropriate affect, mood and behavior. Language:    Normal fluency, repetition, comprehension and naming. Cranial Nerves:   Pupils 2 mm, equal, round and reactive to light. Visual fields full to confrontation. Extraocular movements intact. Facial sensation intact. Full facial strength, no asymmetry. Hearing grossly intact bilaterally. No dysarthria. Tongue protrudes to midline, palate elevates symmetrically. Shoulder shrug 5/5 bilaterally. Motor:    No pronator drift. Bulk and tone normal.      5/5 power in all extremities proximally and distally. No involuntary movements. Sensation:    Sensation intact throughout to light touch. Coordination & Gait: Gait deferred. FTN intact with no ataxia present.     Labs:  Lab Results Component Value Date/Time    WBC 32.5 (H) 07/14/2020 10:34 PM    HGB (POC) 16.1 08/30/2018 12:06 PM    HGB 13.3 07/14/2020 10:34 PM    HCT (POC) 47.5 08/30/2018 12:06 PM    HCT 40.4 07/14/2020 10:34 PM    PLATELET 0,224 ([facility]) 59/00/6944 10:34 PM    MCV 88.0 07/14/2020 10:34 PM      Lab Results   Component Value Date/Time    Sodium 139 07/14/2020 10:34 PM    Potassium 3.3 (L) 07/14/2020 10:34 PM    Chloride 106 07/14/2020 10:34 PM    CO2 26 07/14/2020 10:34 PM    Anion gap 7 07/14/2020 10:34 PM    Glucose 121 (H) 07/14/2020 10:34 PM    BUN 18 07/14/2020 10:34 PM    Creatinine 1.18 07/14/2020 10:34 PM    BUN/Creatinine ratio 15 07/14/2020 10:34 PM    GFR est AA >60 07/14/2020 10:34 PM    GFR est non-AA >60 07/14/2020 10:34 PM    Calcium 8.5 07/14/2020 10:34 PM     Imaging:  CT Results (maximum last 3): Results from [place] encounter on 07/14/20   CTA NECK    Narrative *PRELIMINARY REPORT*    No acute large vessel occlusion or dissection. Subtle asymmetry in vascularity  at the junction of the V3-V4 right vertebral artery segment at as it enters the  foramen magnum, where there is also a small amount of extra-axial hemorrhage  suspected. However, there is venous contamination and not this may be normal  vascularity. Patient scheduled for conventional angiogram tomorrow to evaluate  for possible dural AV fistula. Tortuous mildly dilated left V4 vertebral artery  segment. There is also an extra-axial enhancing and partly calcified mass with  stippled internal vascularity left anterior clinoid more likely representing  meningioma. No intracranial aneurysm. Preliminary report was provided by Dr. José Manuel Sanchez, the on-call radiologist, at [phone]    Final report to follow. *END PRELIMINARY REPORT*   CTA HEAD    Narrative *PRELIMINARY REPORT*    No acute large vessel occlusion or dissection.  Subtle asymmetry in vascularity  at the junction of the V3-V4 right vertebral artery segment at as it enters the  foramen magnum, where there is also a small amount of extra-axial hemorrhage  suspected. However, there is venous contamination and not this may be normal  vascularity. Patient scheduled for conventional angiogram tomorrow to evaluate  for possible dural AV fistula. Tortuous mildly dilated left V4 vertebral artery  segment. There is also an extra-axial enhancing and partly calcified mass with  stippled internal vascularity left anterior clinoid more likely representing  meningioma. No intracranial aneurysm. Preliminary report was provided by Dr. Graham Schmitz, the on-call radiologist, at [phone]    Final report to follow. *END PRELIMINARY REPORT*     CT HEAD WO CONT    Narrative INDICATION: ICH    EXAM:  HEAD CT WITHOUT CONTRAST    COMPARISON: None    TECHNIQUE:  Routine noncontrast axial head CT was performed. Sagittal and  coronal reconstructions were generated. CT dose reduction was achieved through use of a standardized protocol tailored  for this examination and automatic exposure control for dose modulation. FINDINGS:    Ventricles: Midline, no hydrocephalus. Intracranial Hemorrhage: Thin hyperdensity along the left tentorium cerebellum. There is also partly visualized hyperdensity in the posterior aspect of the  foramen magnum. No evidence of acute parenchymal or subarachnoid hemorrhage. Brain Parenchyma/Brainstem: Normal for age. Basal Cisterns: Normal.  Paranasal Sinuses: Visualized sinuses are clear. Additional Comments: Partly calcified extra-axial lesion left anterior  clinoid/sphenoid wing measures 2.0 x 1.8 cm. Subtle 6 mm hyperdensity  extra-axial space right parietal convexity. Impression IMPRESSION:    1. Suspect trace acute left tentorial subdural hemorrhage, and possible  extra-axial hemorrhage in the posterior foramen magnum. No evidence of acute  subarachnoid hemorrhage. 2. Partly calcified extra-axial mass left anterior clinoid process likely  meningioma.  Possible second, much smaller right parietal convexity meningioma. The findings were called to [facility] on 7/14/2020 at 26 by myself. Assessment:     Hospital Problems  Date Reviewed: 3/12/2020          Codes Class Noted POA    ICH (intracerebral hemorrhage) ([facility] ICD-10-CM: I61.9  ICD-9-CM: 376  7/14/2020 Unknown            Plan:     1.) Possible dural AVF vs RCVS   - As seen on CT/CTA 7/14/20   - Plans for diagnostic cerebral angiogram this am with Dr. Pat Landa   - MRI/MRA to further assess   - SBP goal less than 140, Cardene PRN    2.) SDH, nontraumatic   - Likely due to #1   - NSGY consulted, no plans for surgical intervention at this time    I have discussed the diagnosis and the intended plan as seen in the above orders with Dr. Ericka Mitchell, the patient and the primary RN. Patient/family updated on current plan of care and is in agreement. All questions were answered. Also discussed with pt wife Raul Aw [phone] via telephone. Diagnostic cerebral angiogram has been fully reviewed with the patient and his wife and written informed consent has been obtained. He verbalized understanding of need for procedure and agrees to proceed. Thank you for this consult and participating in the care of this patient.   Signed By: Jaxson Quijano NP     July 15, 2020

## 2020-07-15 NOTE — PROGRESS NOTES
[facility]    ICU Team H&P    Name: Colt Hardy   : 1950   MRN: [id number]   Date: 7/15/2020      Subjective:   Progress Note: 7/15/2020      Patient is asked to be seen by Dr. Barron Yanez for [address], necessitating the need for possible ICU care. Reason for ICU Admission:  [address], HTN Emergency    HPI: Patient is a 81 y.o. male with a history of HTN who presented to [facility] in [place], [place] ED with complaints of a sudden acute onset headache. Patient stated that he was working outside trimming his yard when he suddenly had a headache that he describes as \" the worse pain in his life\" that seem to originate in the left frontal region and radiated down his left face and behind his left eye. Stated pain then was a 10/10. Patient states that once he felt this headache he went back inside and placed an ice pack on his head and laid down but the headache did not improve, so he had his wife drive him to the ED. He denied ever falling or hitting his head today or over the last few weeks. Denies any family history of head bleed, strokes, or aneurysms. Patient denies any nausea, vomiting, dizziness, blurred vision, chest pain, back pain or abdominal pain. Denies any present fevers, chills, constipation diarrhea or shortness of breath. In the ED patient was found to be hypertensive up to a systolic of 490'U. Was given a dose of Fentanyl 50 mcg IV x 1 and Zofran 4 mg IV x 1. Notable labs include WBC's 25.3, Platelet count 0,110E, K+ 3.4, BUN/Crit 19/1.54, Non-AA GFR 46, Glucose 117. Patient had a CT of head without contrast which showed a 1.94cm partially calcified lesion in the left para clinoid region, a small amount of subdural hemorrhage along the let tentorium, and tiny lacunar infarction in the anterior aspect of the left lentiform nulceus per transfer CT records.  Patient requested to transfer once was told his CT results and the transfer center was contacted and patient was accepted to [facility] ICU for admission with accepted consult from Neurosurgery Dr. Teresa Mederos. Cardene was recommended and patient was started on it prior to transfer for goal systolic B/P less than 538 mmHg. Patient was then transfer to [address] [place] [address] ICU. Currently patient in room in ICU. Does still complain of mild left sided headache. No nausea or vomiting, dizziness or blurred vision at present. No focal deficits noted at this time. Patient admits to HTN and states is compliant with medications. Denies DM2, Heart disease or MI, denies any liver or renal disease. Denies smoking, ETOH use or illicit drug use. Lives with spouse. POD:* No surgery found *    S/P: NA    Active Problem List:     Problem List  Date Reviewed: 3/12/2020          Codes Class    [facility]) [facility] ICD-10-CM: I61.9  ICD-9-CM: 200         Essential hypertension ICD-10-CM: I10  ICD-9-CM: 401.9         Asthma ICD-10-CM: J45.909  ICD-9-CM: 493.90               Past Medical History:      has a past medical history of Asthma, Contact dermatitis and other eczema, due to unspecified cause, and HTN (hypertension) (2/15/2015). Past Surgical History:      has no past surgical history on file. Home Medications:     Prior to Admission medications    Medication Sig Start Date End Date Taking? Authorizing Provider   atenoloL (TENORMIN) 100 mg tablet Take 1 Tab by mouth two (2) times a day. 5/6/20   Dominick Hickman MD   amoxicillin-clavulanate (AUGMENTIN) 875-125 mg per tablet Take 1 Tab by mouth every twelve (12) hours. 4/24/20   Dominick Hickman MD   ipratropium (Atrovent) 42 mcg (0.06 %) nasal spray 2 Sprays by Both Nostrils route two (2) times a day. 3/12/20   Dominick Hickman MD   raNITIdine (ZANTAC) 150 mg tablet Take 150 mg by mouth two (2) times a day. PRN    Provider, Historical       Allergies/Social/Family History:      Allergies   Allergen Reactions    Prednisone Anaphylaxis      Social History     Tobacco Use  Smoking status: Former Smoker    Smokeless tobacco: Never Used   Substance Use Topics    Alcohol use: No      Family History   Problem Relation Age of Onset    Cancer Maternal Grandmother         colon        Review of Systems:     A comprehensive review of systems was negative except for: Neurological: positive for headaches    Objective:   Vital Signs:  Visit Vitals  /68 (BP 1 Location: Right arm, BP Patient Position: At rest;Supine)   Pulse 64   Temp 98.3 °F (36.8 °C)   Resp 15   Ht 5' 11\" (1.803 m)   Wt 99.4 kg (219 lb 2.2 oz)   SpO2 96%   BMI 30.56 kg/m²    O2 Flow Rate (L/min): 2 l/min O2 Device: Room air Temp (24hrs), Av.4 °F (36.9 °C), Min:98.3 °F (36.8 °C), Max:98.4 °F (36.9 °C)           Intake/Output:     Intake/Output Summary (Last 24 hours) at 7/15/2020 0936  Last data filed at 7/15/2020 0700  Gross per 24 hour   Intake 1492.75 ml   Output 1100 ml   Net 392.75 ml       Physical Exam:    General:  alert, cooperative, no distress, appears stated age  Eye:  conjunctivae/corneas clear. PERRL, EOM's intact. Fundi benign  Neurologic:  normal mood, no focal deficits, CN II-XII intact  Lymphatic:  Cervical, supraclavicular, and axillary nodes normal.   Neck:  normal and no erythema or exudates noted. Lungs:  clear to auscultation bilaterally  Heart:  regular rate and rhythm, S1, S2 normal, no murmur, click, rub or gallop  Abdomen:  soft, non-tender.  Bowel sounds normal. No masses,  no organomegaly  Cardiovascular:  Regular rate and rhythm, S1S2 present, without murmur or extra heart sounds, pedal pulses normal and no edema  Skin:  Normal. and no rash or abnormalities    LABS AND  DATA: Personally reviewed  Recent Labs     07/15/20  0458 07/14/20  2234   WBC 33.9* 32.5*   HGB 12.8 13.3   HCT 38.5 40.4   PLT 1,517* 1,547*     Recent Labs     07/15/20  0454 07/14/20  2234    139   K 3.7 3.3*   * 106   CO2 23 26   BUN 15 18   CREA 0.91 1.18   * 121*   CA 8.4* 8.5   MG 2.0 2.0 PHOS 3.0 3.6     Recent Labs     07/14/20 2234   AP 63   TP 7.0   ALB 4.0   GLOB 3.0     Recent Labs     07/14/20 2234   INR 1.4*   PTP 13.9*   APTT 30.4      No results for input(s): PHI, PCO2I, PO2I, FIO2I in the last 72 hours. No results for input(s): CPK, CKMB, TROIQ, BNPP in the last 72 hours. Hemodynamics:   PAP:   CO:     Wedge:   CI:     CVP:    SVR:       PVR:       Ventilator Settings:  Mode Rate Tidal Volume Pressure FiO2 PEEP                    Peak airway pressure:      Minute ventilation:          MEDS: Reviewed    CT head, CTA head, CTA neck: pending     CT Results  (Last 48 hours)               07/14/20 2153  CT HEAD WO CONT Final result    Impression:  IMPRESSION:       1. Suspect trace acute left tentorial subdural hemorrhage, and possible   extra-axial hemorrhage in the posterior foramen magnum. No evidence of acute   subarachnoid hemorrhage. 2. Partly calcified extra-axial mass left anterior clinoid process likely   meningioma. Possible second, much smaller right parietal convexity meningioma. The findings were called [place] [id number] on 7/14/2020 at 26 by myself. [phone]       Narrative:  INDICATION: ICH       EXAM:  HEAD CT WITHOUT CONTRAST       COMPARISON: None       TECHNIQUE:  Routine noncontrast axial head CT was performed. Sagittal and   coronal reconstructions were generated. CT dose reduction was achieved through use of a standardized protocol tailored   for this examination and automatic exposure control for dose modulation. FINDINGS:       Ventricles: Midline, no hydrocephalus. Intracranial Hemorrhage: Thin hyperdensity along the left tentorium cerebellum. There is also partly visualized hyperdensity in the posterior aspect of the   foramen magnum. No evidence of acute parenchymal or subarachnoid hemorrhage. Brain Parenchyma/Brainstem: Normal for age. Basal Cisterns: Normal.   Paranasal Sinuses: Visualized sinuses are clear.    Additional Comments: Partly calcified extra-axial lesion left anterior   clinoid/sphenoid wing measures 2.0 x 1.8 cm. Subtle 6 mm hyperdensity   extra-axial space right parietal convexity. 07/14/20 2153  CTA HEAD Preliminary result    Narrative:  *PRELIMINARY REPORT*       No acute large vessel occlusion or dissection. Subtle asymmetry in vascularity   at the junction of the V3-V4 right vertebral artery segment at as it enters the   foramen magnum, where there is also a small amount of extra-axial hemorrhage   suspected. However, there is venous contamination and not this may be normal   vascularity. Patient scheduled for conventional angiogram tomorrow to evaluate   for possible dural AV fistula. Tortuous mildly dilated left V4 vertebral artery   segment. There is also an extra-axial enhancing and partly calcified mass with   stippled internal vascularity left anterior clinoid more likely representing   meningioma. No intracranial aneurysm. Preliminary report was provided by Dr. José Manuel Sanchez, the on-call radiologist, at [phone]       Final report to follow. *END PRELIMINARY REPORT*                               07/14/20 2153  CTA NECK Preliminary result    Narrative:  *PRELIMINARY REPORT*       No acute large vessel occlusion or dissection. Subtle asymmetry in vascularity   at the junction of the V3-V4 right vertebral artery segment at as it enters the   foramen magnum, where there is also a small amount of extra-axial hemorrhage   suspected. However, there is venous contamination and not this may be normal   vascularity. Patient scheduled for conventional angiogram tomorrow to evaluate   for possible dural AV fistula. Tortuous mildly dilated left V4 vertebral artery   segment. There is also an extra-axial enhancing and partly calcified mass with   stippled internal vascularity left anterior clinoid more likely representing   meningioma. No intracranial aneurysm.        Preliminary report was provided by Dr. José Manuel Sanchez, the on-call radiologist, at [phone]       Final report to follow. *END PRELIMINARY REPORT*                                   Echo: NA    Assessment:     ICU Problems:  ICH  HTN Emergency  Leukocytosis  Elevated Platelets  Hypokalemia    Past History  Hx HTN  Hx Childhood asthma  Hx Bilateral pneumonia    ICU Comprehensive Plan of Care:   Plans for this Shift:   1. Off Cardene  2. PRN Hydralazine and labetalol for above B/P goals  3. Neurosurgery consulted  4. Q 4 hour neuro assessment checks  5. Had angiogram today, no intervention, stay in bed until 1300. Neurosurgery following. 6. Continue patient on NS at 75 ml/hr post imaging with contrast  7. Monitor renal function  8. Can resume diet  9. PT/OT/Casemanagement consulted. 10. Admission and am labs ordered  11. Rest of plan as below:    Multidisciplinary Rounds Completed:  No    ABCDEF Bundle/Checklist  Pain Medications: Acetaminophen  Target RASS: N/A  Sedation Medications: None  CAM-ICU:  Negative  Mobility: Good   PT/OT: PT consulted and on board, OT consulted and on board and Speech therapy consulted and on board   Restraints: None needed at this time  Discussed Plan of Care (goals of care): Yes  Addressed Code Status: Full Code    CARDIOVASCULAR  Cardiac Gtts: Nicardipine (Cardene)  SBP Goal of: < 140 mmHg  MAP Goal of: > 65 mmHg  Transfusion Trigger (Hgb): <7 g/dL    RESPIRATORY  Vent Goals:   N/A  DVT Prophylaxis (if no, list reason): SCD's or Sequential Compression Device   SPO2 Goal: > 92%  Pulmonary toilet: Incentive Spirometry     GI/  Maurer Catheter Present: No  GI Prophylaxis: Not at this time   Nutrition: Pending NPO at present, may later advance to reg diet of if no surgical intervention tonight.    IVFs: NS@ 75 ml/hr  Bowel Movement: Pending  Bowel Regimen: None needed at this time  Insulin: NA    ANTIBIOTICS  Antibiotics:  None    T/L/D  Tubes: None  Lines: Peripheral IV  Drains: None    SPECIAL EQUIPMENT  None    DISPOSITION  Transfer to non-ICU bed    CRITICAL CARE CONSULTANT NOTE  I had a face to face encounter with the patient, reviewed and interpreted patient data including clinical events, labs, images, vital signs, I/O's, and examined patient. I have discussed the case and the plan and management of the patient's care with the consulting services, the bedside nurses and the respiratory therapist.      NOTE OF PERSONAL INVOLVEMENT IN CARE   This patient has a high probability of imminent, clinically significant deterioration, which requires the highest level of preparedness to intervene urgently. I participated in the decision-making and personally managed or directed the management of the following life and organ supporting interventions that required my frequent assessment to treat or prevent imminent deterioration. I personally spent 40 minutes of critical care time. This is time spent at this critically ill patient's bedside actively involved in patient care as well as the coordination of care and discussions with the patient's family. This does not include any procedural time which has been billed separately.       Ban Singh [facility]     Critical Care Medicine  [facility]

## 2020-07-15 NOTE — CONSULTS
70yo with acute onset severe headache yesterday afternoon. Presented to ED in [place], [place]. Head CT there showed left tentorial SDH and he was transferred to [place] [address] ICU. Repeat imaging shows small left tentorial SDH with CTA showing irregularity of vertebral artery at foramen magnum, hypoplastic left transverse sinus. In [facility] there is a small amount of hemorrhage at foramen magnum. These findings are concerning for AV fistula. NIS has been consulted with plans for MRI/MRA and angiogram.  Also found on CT is left calcified sphenid mass most likely meningioma. This is an incidental finding. At this point no role for surgical intervention. Agree with BP control. We will be available as needed.

## 2020-07-15 NOTE — ROUTINE PROCESS
0730: Bedside and Verbal shift change report given to Fernando Mcclain RN (oncoming nurse) by Georgie Urbina RN (offgoing nurse). Report included the following information SBAR, Kardex, ED Summary, Procedure Summary, Intake/Output, MAR, Recent Results, Med Rec Status, Cardiac Rhythm SR and Alarm Parameters .

## 2020-07-15 NOTE — PROGRESS NOTES
TRANSFER - OUT REPORT:    Verbal report given to Kiersten Díaz RN on Janet Valderrama  being transferred to [address] [place] [address] for routine progression of care       Report consisted of patients Situation, Background, Assessment and   Recommendations(SBAR). Information from the following report(s) SBAR, Procedure Summary and MAR was reviewed with the receiving nurse. Lines:   Peripheral IV 07/14/20 Left Antecubital (Active)   Site Assessment Clean, dry, & intact 07/15/20 0400   Phlebitis Assessment 0 07/15/20 0400   Infiltration Assessment 0 07/15/20 0400   Dressing Status Clean, dry, & intact 07/15/20 0400   Dressing Type Tape;Transparent 07/15/20 0400   Hub Color/Line Status Green;Flushed;Capped 07/15/20 0400   Action Taken Open ports on tubing capped 07/15/20 0400   Alcohol Cap Used Yes 07/15/20 0400       Peripheral IV 07/14/20 Posterior;Right Hand (Active)   Site Assessment Clean, dry, & intact 07/15/20 0400   Phlebitis Assessment 0 07/15/20 0400   Infiltration Assessment 0 07/15/20 0400   Dressing Status Clean, dry, & intact 07/15/20 0400   Dressing Type Tape;Transparent 07/15/20 0400   Hub Color/Line Status Pink; Infusing 07/15/20 0400   Action Taken Open ports on tubing capped 07/15/20 0400   Alcohol Cap Used Yes 07/15/20 0400        Opportunity for questions and clarification was provided.       Patient transported with:   Monitor, transport, and angio ADAM Estrada in bed back to ICU 10

## 2020-07-15 NOTE — PROGRESS NOTES
Spiritual Care Assessment/Progress Note  [facility]      NAME: Tori Veloz      MRN: [id number]  AGE: 81 y.o. SEX: male  Mandaen Affiliation: No Yarsani   Language: English     7/15/2020     Total Time (in minutes): 5     Spiritual Assessment begun in [address] through conversation with:         []Patient        [] Family    [] Friend(s)        Reason for Consult: Initial/Spiritual assessment, critical care     Spiritual beliefs: (Please include comment if needed)     [] Identifies with a arnold tradition:         [] Supported by a arnold community:            [] Claims no spiritual orientation:           [] Seeking spiritual identity:                [] Adheres to an individual form of spirituality:           [x] Not able to assess:                           Identified resources for coping:      [] Prayer                               [] Music                  [] Guided Imagery     [] Family/friends                 [] Pet visits     [] Devotional reading                         [] Unknown     [] Other:                                            Interventions offered during this visit: (See comments for more details)    Patient Interventions: Initial visit           Plan of Care:     [x] Support spiritual and/or cultural needs    [] Support AMD and/or advance care planning process      [] Support grieving process   [] Coordinate Rites and/or Rituals    [] Coordination with community clergy   [] No spiritual needs identified at this time   [] Detailed Plan of Care below (See Comments)  [] Make referral to Music Therapy  [] Make referral to Pet Therapy     [] Make referral to Addiction services  [] Make referral to [facility]  [] Make referral to Spiritual Care Partner  [] No future visits requested        [] Follow up visits as needed     Comments: Initial visit attempted with patient in ICU. Pt was speaking with his wife on phone earlier - patient resting now.   Spiritual care card and hand-written note left for patient. Spiritual care is available to provide support to patient as needed/desired. Please page [phone]. Visit by: Shital Quinonez D.Min, MA, [address] [facility]    Lead  Profession Development & Advancement

## 2020-07-15 NOTE — PROGRESS NOTES
Attended Interdisciplinary rounds in Critical Care Unit, where patient care was discussed. Visit by: Elin William. Rebekah Xiao.  Kennedy Stevens MA, [address]

## 2020-07-15 NOTE — PROGRESS NOTES
2000:  Bedside and Verbal shift change report given to Filiberto (oncoming nurse) by Yaz Thomas RN (offgoing nurse). Report included the following information SBAR, Kardex, ED Summary, Intake/Output, MAR, Accordion, Recent Results, Med Rec Status, Cardiac Rhythm sinus rhythm, Alarm Parameters  and Dual Neuro Assessment. Primary Nurse Geofm Gottron, ADAM and Curtis Wei RN, RN performed a dual skin assessment on this patient No impairment noted  Tr score is 23    [id number]:  Patient off the floor for STAT CT/CTA.    0000:  Reassessment no changes. [id number]: Off the floor for MRI/MRA    0200:  Unable to preform neuro exam due to patient being in MRI scanner. 0400:  Reassessment, no changes. 5420: TRANSFER - OUT REPORT:    Verbal report given to angio RN(name) on Eaton Pickles  being transferred to angio(unit) for ordered procedure       Report consisted of patients Situation, Background, Assessment and   Recommendations(SBAR). Information from the following report(s) SBAR, Kardex, OR Summary, Procedure Summary, Intake/Output, MAR, Accordion, Recent Results, Med Rec Status, Cardiac Rhythm sinus zenon and Pre Procedure Checklist was reviewed with the receiving nurse.     Lines:   Peripheral IV 07/14/20 Left Antecubital (Active)   Site Assessment Clean, dry, & intact 07/15/20 1600   Phlebitis Assessment 0 07/15/20 1600   Infiltration Assessment 0 07/15/20 1600   Dressing Status Clean, dry, & intact 07/15/20 1600   Dressing Type Transparent;Tape 07/15/20 1600   Hub Color/Line Status Green;Capped 07/15/20 1600   Action Taken Open ports on tubing capped 07/15/20 1600   Alcohol Cap Used Yes 07/15/20 1600       Peripheral IV 07/14/20 Posterior;Right Hand (Active)   Site Assessment Clean, dry, & intact 07/15/20 1600   Phlebitis Assessment 0 07/15/20 1600   Infiltration Assessment 0 07/15/20 1600   Dressing Status Clean, dry, & intact 07/15/20 1600   Dressing Type Transparent;Tape 07/15/20 1600   Hub Color/Line Status Pink; Infusing 07/15/20 1600   Action Taken Open ports on tubing capped 07/15/20 1600   Alcohol Cap Used Yes 07/15/20 1600        Opportunity for questions and clarification was provided.       Patient transported with:   Monitor  Registered Nurse  Tech

## 2020-07-15 NOTE — H&P
[facility]    ICU Team H&P    Name: Berkeley Schirmer   : 1950   MRN: [id number]   Date: 2020      Subjective:   Progress Note: 2020      Patient is asked to be seen by Dr. Nena Perez for [address], necessitating the need for possible ICU care. Reason for ICU Admission:  [address], HTN Emergency    HPI: Patient is a 81 y.o. male with a history of HTN who presented to [facility] in [place], [place] ED with complaints of a sudden acute onset headache. Patient stated that he was working outside trimming his yard when he suddenly had a headache that he describes as \" the worse pain in his life\" that seem to originate in the left frontal region and radiated down his left face and behind his left eye. Stated pain then was a 10/10. Patient states that once he felt this headache he went back inside and placed an ice pack on his head and laid down but the headache did not improve, so he had his wife drive him to the ED. He denied ever falling or hitting his head today or over the last few weeks. Denies any family history of head bleed, strokes, or aneurysms. Patient denies any nausea, vomiting, dizziness, blurred vision, chest pain, back pain or abdominal pain. Denies any present fevers, chills, constipation diarrhea or shortness of breath. In the ED patient was found to be hypertensive up to a systolic of 197'F. Was given a dose of Fentanyl 50 mcg IV x 1 and Zofran 4 mg IV x 1. Notable labs include WBC's 25.3, Platelet count 1,147A, K+ 3.4, BUN/Crit 19/1.54, Non-AA GFR 46, Glucose 117. Patient had a CT of head without contrast which showed a 1.94cm partially calcified lesion in the left para clinoid region, a small amount of subdural hemorrhage along the let tentorium, and tiny lacunar infarction in the anterior aspect of the left lentiform nulceus per transfer CT records.  Patient requested to transfer once was told his CT results and the transfer center was contacted and patient was accepted to [facility] ICU for admission with accepted consult from Neurosurgery Dr. Fadumo Banerjee. Cardene was recommended and patient was started on it prior to transfer for goal systolic B/P less than 615 mmHg. Patient was then transfer to [facility] ICU. Currently patient in room in ICU. Does still complain of mild left sided headache. No nausea or vomiting, dizziness or blurred vision at present. No focal deficits noted at this time. Patient admits to HTN and states is compliant with medications. Denies DM2, Heart disease or MI, denies any liver or renal disease. Denies smoking, ETOH use or illicit drug use. Lives with spouse. POD:* No surgery found *    S/P: NA    Active Problem List:     Problem List  Date Reviewed: 3/12/2020          Codes Class    [facility] ICD-10-CM: I61.9  ICD-9-CM: 200         Essential hypertension ICD-10-CM: I10  ICD-9-CM: 401.9         Asthma ICD-10-CM: J45.909  ICD-9-CM: 493.90               Past Medical History:      has a past medical history of Asthma, Contact dermatitis and other eczema, due to unspecified cause, and HTN (hypertension) (2/15/2015). Past Surgical History:      has no past surgical history on file. Home Medications:     Prior to Admission medications    Medication Sig Start Date End Date Taking? Authorizing Provider   atenoloL (TENORMIN) 100 mg tablet Take 1 Tab by mouth two (2) times a day. 5/6/20   Hai Meadows MD   amoxicillin-clavulanate (AUGMENTIN) 875-125 mg per tablet Take 1 Tab by mouth every twelve (12) hours. 4/24/20   Hai Meadows MD   ipratropium (Atrovent) 42 mcg (0.06 %) nasal spray 2 Sprays by Both Nostrils route two (2) times a day. 3/12/20   Hai Meadows MD   raNITIdine (ZANTAC) 150 mg tablet Take 150 mg by mouth two (2) times a day. PRN    Provider, Historical       Allergies/Social/Family History:      Allergies   Allergen Reactions    Prednisone Anaphylaxis      Social History     Tobacco Use  Smoking status: Former Smoker    Smokeless tobacco: Never Used   Substance Use Topics    Alcohol use: No      Family History   Problem Relation Age of Onset    Cancer Maternal Grandmother         colon        Review of Systems:     A comprehensive review of systems was negative except for: Neurological: positive for headaches    Objective:   Vital Signs:  Visit Vitals  /69   Pulse 77   Temp 98.4 °F (36.9 °C)   Resp 17   Ht 5' 11\" (1.803 m)   Wt 99.4 kg (219 lb 2.2 oz)   SpO2 94%   BMI 30.56 kg/m²      O2 Device: Room air Temp (24hrs), Av.4 °F (36.9 °C), Min:98.4 °F (36.9 °C), Max:98.4 °F (36.9 °C)           Intake/Output:     Intake/Output Summary (Last 24 hours) at 2020 2240  Last data filed at 2020 2200  Gross per 24 hour   Intake 346.67 ml   Output    Net 346.67 ml       Physical Exam:    General:  alert, cooperative, no distress, appears stated age  Eye:  conjunctivae/corneas clear. PERRL, EOM's intact. Fundi benign  Neurologic:  normal mood, no focal deficits, CN II-XII intact  Lymphatic:  Cervical, supraclavicular, and axillary nodes normal.   Neck:  normal and no erythema or exudates noted. Lungs:  clear to auscultation bilaterally  Heart:  regular rate and rhythm, S1, S2 normal, no murmur, click, rub or gallop  Abdomen:  soft, non-tender. Bowel sounds normal. No masses,  no organomegaly  Cardiovascular:  Regular rate and rhythm, S1S2 present, without murmur or extra heart sounds, pedal pulses normal and no edema  Skin:  Normal. and no rash or abnormalities    LABS AND  DATA: Personally reviewed  No results for input(s): WBC, HGB, HCT, PLT, HGBEXT, HCTEXT, PLTEXT, HGBEXT, HCTEXT, PLTEXT in the last 72 hours. No results for input(s): NA, K, CL, CO2, BUN, CREA, GLU, CA, MG, PHOS in the last 72 hours. No results for input(s): AP, TBIL, TP, ALB, GLOB, AML, LPSE in the last 72 hours.     No lab exists for component: SGOT, GPT, AMYP  No results for input(s): INR, PTP, APTT, INREXT, INREXT in the last 72 hours. No results for input(s): PHI, PCO2I, PO2I, FIO2I in the last 72 hours. No results for input(s): CPK, CKMB, TROIQ, BNPP in the last 72 hours. Hemodynamics:   PAP:   CO:     Wedge:   CI:     CVP:    SVR:       PVR:       Ventilator Settings:  Mode Rate Tidal Volume Pressure FiO2 PEEP                    Peak airway pressure:      Minute ventilation:          MEDS: Reviewed    CT head, CTA head, CTA neck: pending     CT Results  (Last 48 hours)               07/14/20 2153  CT HEAD WO CONT Final result    Impression:  IMPRESSION:       1. Suspect trace acute left tentorial subdural hemorrhage, and possible   extra-axial hemorrhage in the posterior foramen magnum. No evidence of acute   subarachnoid hemorrhage. 2. Partly calcified extra-axial mass left anterior clinoid process likely   meningioma. Possible second, much smaller right parietal convexity meningioma. The findings were called to Ruddy Ellsworth on 7/14/2020 at 26 by myself. [phone]       Narrative:  INDICATION: ICH       EXAM:  HEAD CT WITHOUT CONTRAST       COMPARISON: None       TECHNIQUE:  Routine noncontrast axial head CT was performed. Sagittal and   coronal reconstructions were generated. CT dose reduction was achieved through use of a standardized protocol tailored   for this examination and automatic exposure control for dose modulation. FINDINGS:       Ventricles: Midline, no hydrocephalus. Intracranial Hemorrhage: Thin hyperdensity along the left tentorium cerebellum. There is also partly visualized hyperdensity in the posterior aspect of the   foramen magnum. No evidence of acute parenchymal or subarachnoid hemorrhage. Brain Parenchyma/Brainstem: Normal for age. Basal Cisterns: Normal.   Paranasal Sinuses: Visualized sinuses are clear. Additional Comments: Partly calcified extra-axial lesion left anterior   clinoid/sphenoid wing measures 2.0 x 1.8 cm.  Subtle 6 mm hyperdensity extra-axial space right parietal convexity. 07/14/20 2153  CTA HEAD Preliminary result    Narrative:  *PRELIMINARY REPORT*       No acute large vessel occlusion or dissection. Subtle asymmetry in vascularity   at the junction of the V3-V4 right vertebral artery segment at as it enters the   foramen magnum, where there is also a small amount of extra-axial hemorrhage   suspected. However, there is venous contamination and not this may be normal   vascularity. Patient scheduled for conventional angiogram tomorrow to evaluate   for possible dural AV fistula. Tortuous mildly dilated left V4 vertebral artery   segment. There is also an extra-axial enhancing and partly calcified mass with   stippled internal vascularity left anterior clinoid more likely representing   meningioma. No intracranial aneurysm. Preliminary report was provided by Dr. Peggy Schmitz, the on-call radiologist, at [phone]       Final report to follow. *END PRELIMINARY REPORT*                               07/14/20 2153  CTA NECK Preliminary result    Narrative:  *PRELIMINARY REPORT*       No acute large vessel occlusion or dissection. Subtle asymmetry in vascularity   at the junction of the V3-V4 right vertebral artery segment at as it enters the   foramen magnum, where there is also a small amount of extra-axial hemorrhage   suspected. However, there is venous contamination and not this may be normal   vascularity. Patient scheduled for conventional angiogram tomorrow to evaluate   for possible dural AV fistula. Tortuous mildly dilated left V4 vertebral artery   segment. There is also an extra-axial enhancing and partly calcified mass with   stippled internal vascularity left anterior clinoid more likely representing   meningioma. No intracranial aneurysm. Preliminary report was provided by Dr. Peggy Schmitz, the on-call radiologist, at [phone]       Final report to follow.        *END PRELIMINARY REPORT* Echo: NA    Assessment:     ICU Problems:  ICH  HTN Emergency  Leukocytosis  Elevated Platelets  Hypokalemia    Past History  Hx HTN  Hx Childhood asthma  Hx Bilateral pneumonia    ICU Comprehensive Plan of Care:   Plans for this Shift:   1. Continue Cardene gtt for goal systolic B/P 747- 382 mmHg  2. PRN Hydralazine and labetalol for above B/P goals  3. Neurosurgery consulted  4. Q 1 hour neuro assessment checks  5. Stat Head CT, Head CTA, Neck CTA with contrast- discussed renal function with radiologist.  6. If scan shows area with possible aneurysm may need to consult Neuro IR  7. Continue patient on NS at 75 ml/hr post imaging with contrast  8. Monitor renal function, strict I&Os  9. NPO with sips of clears until after scans complete and reported, then may advance to reg diet if no surgical or angio intervention warranted. 10. PT/OT/Casemanagement consulted. 11. Admission and am labs ordered  12. Rest of plan as below:    Multidisciplinary Rounds Completed:  No    ABCDEF Bundle/Checklist  Pain Medications: Acetaminophen  Target RASS: N/A  Sedation Medications: None  CAM-ICU:  Negative  Mobility: Good   PT/OT: PT consulted and on board, OT consulted and on board and Speech therapy consulted and on board   Restraints: None needed at this time  Discussed Plan of Care (goals of care): Yes  Addressed Code Status: Full Code    CARDIOVASCULAR  Cardiac Gtts: Nicardipine (Cardene)  SBP Goal of: < 140 mmHg  MAP Goal of: > 65 mmHg  Transfusion Trigger (Hgb): <7 g/dL    RESPIRATORY  Vent Goals:   N/A  DVT Prophylaxis (if no, list reason): SCD's or Sequential Compression Device   SPO2 Goal: > 92%  Pulmonary toilet: Incentive Spirometry     GI/  Maurer Catheter Present: No  GI Prophylaxis: Not at this time   Nutrition: Pending NPO at present, may later advance to reg diet of if no surgical intervention tonight.    IVFs: NS@ 75 ml/hr  Bowel Movement: Pending  Bowel Regimen: None needed at this time  Insulin: NA    ANTIBIOTICS  Antibiotics:  None    T/L/D  Tubes: None  Lines: Peripheral IV  Drains: None    SPECIAL EQUIPMENT  None    DISPOSITION  Stay in ICU    CRITICAL CARE CONSULTANT NOTE  I had a face to face encounter with the patient, reviewed and interpreted patient data including clinical events, labs, images, vital signs, I/O's, and examined patient. I have discussed the case and the plan and management of the patient's care with the consulting services, the bedside nurses and the respiratory therapist.      NOTE OF PERSONAL INVOLVEMENT IN CARE   This patient has a high probability of imminent, clinically significant deterioration, which requires the highest level of preparedness to intervene urgently. I participated in the decision-making and personally managed or directed the management of the following life and organ supporting interventions that required my frequent assessment to treat or prevent imminent deterioration. I personally spent 50 minutes of critical care time. This is time spent at this critically ill patient's bedside actively involved in patient care as well as the coordination of care and discussions with the patient's family. This does not include any procedural time which has been billed separately.       Barby Tellez [facility]     [address] [facility]

## 2020-07-15 NOTE — ROUTINE PROCESS
1000: TRANSFER - IN REPORT:    Verbal report received from Alisia Chandler NP (name) on [place]  being received from Angio(unit) for routine post - op      Report consisted of patients Situation, Background, Assessment and   Recommendations(SBAR). Information from the following report(s) SBAR, Kardex, ED Summary, Procedure Summary, Intake/Output, MAR, Recent Results, Med Rec Status, Cardiac Rhythm SB/SR, Alarm Parameters  and Dual Neuro Assessment was reviewed with the receiving nurse. Opportunity for questions and clarification was provided. Assessment completed upon patients arrival to unit and care assumed. 1030: Patient arrived from angio suite. Neuro intact. VSS. R groin site c/d/i. Edith Schroeder NP, at bedside assessing patient. Orders received for q4 neuro checks and downgrade to NSTU.    1300: Dr Gisselle Nair at bedside assessing patient. Patient to be NPO starting now and will have abdominal ultrasound at 2045 tonight.     1600: Dr. Anisa Sandoval at bedside. No new orders received. 1915: Patient to ultrasound with transport. 1930: Bedside and Verbal shift change report given to Sheron Denson RN (oncoming nurse) by Kala Valdez RN (offgoing nurse). Report included the following information SBAR, Kardex, ED Summary, Procedure Summary, Intake/Output, MAR, Recent Results, Med Rec Status, Cardiac Rhythm SB/SR and Alarm Parameters .

## 2020-07-15 NOTE — BRIEF OP NOTE
NEUROINTERVENTIONAL SURGERY POST-PROCEDURE NOTE    PROCEDURE:  Diagnostic cerebral angiogram    VESSEL(S) STUDIED:  1. Right common carotid artery  2. Left common carotid artery  3. Left Internal carotid artery  4. Left external carotid artery  5. Left vertebral artery VESSEL(S) TREATED:  1. None      COMPLICATIONS:  None    FOLLOW-UP:  Follow up with neurosurgery DATE OF SERVICE:  7/15/2020 9:32 AM     ATTENDING SURGEON(S):  Elizabeth Block MD    ANESTHESIA:   Mild sedation    MEDICATIONS:   See nursing record    PUNCTURE SITE:  Right common femoral artery. Arteriotomy closed with 6F Angioseal.  Flat x 2 hours. Right leg immobilization for 5h.          Elizabeth Block MD  NeuroInterventional Surgery

## 2020-07-16 ENCOUNTER — TELEPHONE (OUTPATIENT)
Dept: ONCOLOGY | Age: 70
End: 2020-07-16

## 2020-07-16 VITALS
HEIGHT: 71 IN | RESPIRATION RATE: 17 BRPM | HEART RATE: 73 BPM | WEIGHT: 225.09 LBS | TEMPERATURE: 99.1 F | SYSTOLIC BLOOD PRESSURE: 176 MMHG | BODY MASS INDEX: 31.51 KG/M2 | DIASTOLIC BLOOD PRESSURE: 83 MMHG | OXYGEN SATURATION: 94 %

## 2020-07-16 LAB
EPO SERPL-ACNC: 10 MIU/ML (ref 2.6–18.5)
VWF AG ACT/NOR PPP IA: 95 % (ref 50–200)
VWF:RCO ACT/NOR PPP PL AGG: 34 % (ref 50–200)

## 2020-07-16 PROCEDURE — 74011250637 HC RX REV CODE- 250/637: Performed by: FAMILY MEDICINE

## 2020-07-16 PROCEDURE — 74011250637 HC RX REV CODE- 250/637: Performed by: NURSE PRACTITIONER

## 2020-07-16 PROCEDURE — 74011000250 HC RX REV CODE- 250: Performed by: NURSE PRACTITIONER

## 2020-07-16 RX ORDER — NIFEDIPINE 30 MG/1
30 TABLET, EXTENDED RELEASE ORAL DAILY
Status: DISCONTINUED | OUTPATIENT
Start: 2020-07-16 | End: 2020-07-16 | Stop reason: HOSPADM

## 2020-07-16 RX ADMIN — Medication 10 ML: at 00:28

## 2020-07-16 RX ADMIN — NIFEDIPINE 30 MG: 30 TABLET, FILM COATED, EXTENDED RELEASE ORAL at 08:31

## 2020-07-16 RX ADMIN — LABETALOL HYDROCHLORIDE 20 MG: 5 INJECTION INTRAVENOUS at 06:13

## 2020-07-16 RX ADMIN — Medication 10 ML: at 06:19

## 2020-07-16 RX ADMIN — LABETALOL HYDROCHLORIDE 20 MG: 5 INJECTION INTRAVENOUS at 00:38

## 2020-07-16 RX ADMIN — LABETALOL HYDROCHLORIDE 20 MG: 5 INJECTION INTRAVENOUS at 10:25

## 2020-07-16 RX ADMIN — BUTALBITAL, ACETAMINOPHEN, AND CAFFEINE 2 TABLET: 50; 325; 40 TABLET ORAL at 04:18

## 2020-07-16 NOTE — TELEPHONE ENCOUNTER
According to notes, patient left hospital AMA. Called patient to discuss need for follow up and repeat labwork. No answer. Left voicemail asking to return call to the office.

## 2020-07-16 NOTE — PROGRESS NOTES
Physical Therapy  7/16/2020    Pt received standing in room and agitated. Did not allow introduction of self and instead states, \"I don't need to see you I need to see the (expletive) (expletive) doctor. \" Pt refused therapy evaluation yesterday and will not re-attempt evaluation today. If pt refuses again tomorrow will sign off. Does not appear to have acute therapy needs from mobility observed in room.     Thank Luis Alberto Feliciano, PT, DPT

## 2020-07-16 NOTE — PROGRESS NOTES
1120: Patient adamant on leaving AMA. Wife at bedside. Dr. Elsa Hopson notified via telephone and spoke with patient on phone. Patient educated on risks of leaving against medical advice. Informed Refusal completed and placed on physical chart. PIV and telemetry discontinued. Patient returning home with belongings via wife.

## 2020-07-16 NOTE — PROGRESS NOTES
[facility] [place] [facility] [place] [address] [address]ia Leach, [place]: [phone]  F: [phone]    Reason for Visit:   Neal Flores is a 81 y.o. male who is seen in consultation at the request of Abiola Medina for evaluation of thrombocytosis    History of Present Illness:   Patient is a 81 y.o. male  With HTN who was admitted on 7/14/2020 with a sudden headache. He had initially presented to [facility] the same day with a 10/10 HA and CT showed a 1.9 cm calcified lesion in the brain with SDH when he requested a transfer to [facility]. He had a CTA neck and MRI brain on admission that was notable for no aneurysms , a left anterior 2 cm meningioma, R parietal 6 mm meningioma and a small SDH. An angiogram showed a small dural branch coming off the left middle meningeal artery supplying a vascular lesion at the level of the left paraclinoid region suggestive of a vascular lesion. No interventions are planned. His HTN is being managed aggressively. We are consulted for extreme thrombocytosis noted on admission with a platelet count of 2325J. Platelets were about 1800k at [facility]. He also had leucocytosis with a WBC count of 33k with 63% PMNs, 10% Lymph, 9% basophils and some myelocytes. He has been healthy up until now. Prior CBC 2018 was normal  No h/o abnormal bleeding and no h/o DVT/PE/ Strokes  Has been uptodate with colonoscopies, does not smoke  He has no fevers, chills, sweats, CP, SOB, falls, rashes, pruritus    His Brother was diagnosed with polycythemia in his 62s and undergoes periodic phlebotomies    INTERIM HX:  Pt seen today for hospital f/u of high platelets. Pt refused labs this am. Is agitated and wants to go home. Past Medical History:   Diagnosis Date    Asthma     Contact dermatitis and other eczema, due to unspecified cause     HTN (hypertension) 2/15/2015      History reviewed. No pertinent surgical history.    Social History     Tobacco Use    Smoking status: Former Smoker    Smokeless tobacco: Never Used   Substance Use Topics    Alcohol use: No      Family History   Problem Relation Age of Onset    Cancer Maternal Grandmother         colon      Current Facility-Administered Medications   Medication Dose Route Frequency    NIFEdipine ER (PROCARDIA XL) tablet 30 mg  30 mg Oral DAILY    butalbital-acetaminophen-caffeine (FIORICET, ESGIC) -40 mg per tablet 1-2 Tab  1-2 Tab Oral Q6H PRN    metoclopramide HCl (REGLAN) injection 5 mg  5 mg IntraVENous Q6H PRN    labetaloL (NORMODYNE;TRANDATE) injection 20 mg  20 mg IntraVENous Q4H PRN    Or    hydrALAZINE (APRESOLINE) 20 mg/mL injection 20 mg  20 mg IntraVENous Q4H PRN    niCARdipine (CARDENE) 25 mg in 0.9% sodium chloride 250 mL infusion  0-15 mg/hr IntraVENous TITRATE    sodium chloride (NS) flush 5-40 mL  5-40 mL IntraVENous Q8H    sodium chloride (NS) flush 5-40 mL  5-40 mL IntraVENous PRN    ondansetron (ZOFRAN) injection 4 mg  4 mg IntraVENous Q4H PRN    acetaminophen (TYLENOL) tablet 650 mg  650 mg Oral Q4H PRN    Or    acetaminophen (TYLENOL) solution 650 mg  650 mg Per NG tube Q4H PRN    Or    acetaminophen (TYLENOL) suppository 650 mg  650 mg Rectal Q4H PRN    0.9% sodium chloride infusion  75 mL/hr IntraVENous CONTINUOUS    acetaminophen (TYLENOL) tablet 650 mg  650 mg Oral Q6H PRN      Allergies   Allergen Reactions    Prednisone Anaphylaxis        Review of Systems: A complete review of systems was obtained, negative except as described above. Physical Exam:     Visit Vitals  /90   Pulse 74   Temp 98.4 °F (36.9 °C)   Resp 11   Ht 5' 11\" (1.803 m)   Wt 225 lb 1.4 oz (102.1 kg)   SpO2 97%   BMI 30.96 kg/m²     ECOG PS: 1  General: No distress  Eyes:  anicteric sclerae  HENT: Atraumatic  Neck: Supple  Respiratory:  normal respiratory effort  MS: in bed  Skin: No rashes, ecchymoses, or petechiae. Normal temperature, turgor, and texture.   Psych: agitated, conversant    Results:     Lab Results   Component Value Date/Time    WBC 33.9 (H) 07/15/2020 04:58 AM    HGB 12.8 07/15/2020 04:58 AM    HCT 38.5 07/15/2020 04:58 AM    PLATELET 0,852 (HH) 35/41/1339 04:58 AM    MCV 87.1 07/15/2020 04:58 AM    ABS. NEUTROPHILS 24.1 (H) 07/15/2020 04:58 AM    HGB (POC) 16.1 08/30/2018 12:06 PM    HCT (POC) 47.5 08/30/2018 12:06 PM     Lab Results   Component Value Date/Time    Sodium 140 07/15/2020 04:54 AM    Potassium 3.7 07/15/2020 04:54 AM    Chloride 109 (H) 07/15/2020 04:54 AM    CO2 23 07/15/2020 04:54 AM    Glucose 108 (H) 07/15/2020 04:54 AM    BUN 15 07/15/2020 04:54 AM    Creatinine 0.91 07/15/2020 04:54 AM    GFR est AA >60 07/15/2020 04:54 AM    GFR est non-AA >60 07/15/2020 04:54 AM    Calcium 8.4 (L) 07/15/2020 04:54 AM     Lab Results   Component Value Date/Time    Bilirubin, total 0.5 07/14/2020 10:34 PM    ALT (SGPT) 36 07/14/2020 10:34 PM    Alk. phosphatase 63 07/14/2020 10:34 PM    Protein, total 7.0 07/14/2020 10:34 PM    Albumin 4.0 07/14/2020 10:34 PM    Globulin 3.0 07/14/2020 10:34 PM       Lab Results   Component Value Date/Time    Iron % saturation 56 (H) 07/15/2020 01:49 PM    TIBC 232 (L) 07/15/2020 01:49 PM    Ferritin 435 (H) 07/15/2020 01:49 PM     (H) 07/15/2020 01:49 PM    TSH 4.200 08/26/2019 09:06 AM    Hep C Virus Ab <0.1 08/30/2018 11:37 AM     Lab Results   Component Value Date/Time    INR 1.4 (H) 07/14/2020 10:34 PM    aPTT 30.4 07/14/2020 10:34 PM       Records reviewed and summarized above. Pathology report(s) reviewed above. Radiology report(s) reviewed above.   Reviewed MRI, CTA and angiogram    Assessment:   1) Extreme thrombocytosis     No CBC since 2018 when it was normal up until 7/14/2020 where he is noted to have left shifted granulopoiesis with extreme thrombocytosis     This may be reactive to acute intracranial bleed given that numbers are trending down in the last 24 hours alone    However a myeloproliferative neoplasm is not r/o  Work up is being sent as below though these will not likely result for 5-7 business days  In the meantime we will assess for any co oexisting hemostatic defects such as acquired vWF deficiency which may be seen with extreme thrombocytosis and may increase the risk of bleeding  If he has an activity level of < 30% I will empirically start him on Hydrea until we have additional test results    CBC and labs pending. Pt clinically feels ok. Agitated this am and refused lab draw. Wants to go home. 2) Leucocytosis - left shifted  Likely reactive but will r/o CML    3) SDH  Discussed with Dr. Cheyanne Salmeron  Though the angio was negative  suspicion for an aneurysm is high - being managed conservatively    4) HTN per IM        Plan:     · Erythropoietin, JAK2, BCR ABL PCR, vWF panel, LD  · NO Aspirin  · USG ABDOMEN to assess for splenomegaly  · If vWF activity is < 30% and platelets tomorrow remain > 1 million will start Hydrea 1000 mg daily while we await results of other tests. If platelets < 1 million tomorrow then will NOT start hydrea  · Cbc diff daily  · Pending labs today    I appreciate the opportunity to participate in Mr. Jackson Jerod care.     Signed By: Sherren Mire, DO

## 2020-07-16 NOTE — PROGRESS NOTES
Neurointerventional Surgery Progress Note  Patient is doing well. I discussed with patient yesterday and today the results of the cerebral angiogram. There was no evidence of intracranial aneurysm. Patient has a small mass on the left paraclinoid region and will need to follow with Neurosurgery.     Gordo Escamilla MD  NeuroInterventional Surgery

## 2020-07-16 NOTE — PROGRESS NOTES
Occupational Therapy: OT attempted to see patient for OT evaluation. Despite education for role of OT and encouragement, patient refusing to participate stating \"I'm already doing all that and will be getting dressed when my wife comes. .. I'm not going to be walking all over the place and doing exercises\". Patient refusing OT eval at this time.    Ovidio Walton, OTR/L

## 2020-07-16 NOTE — PROGRESS NOTES
Problem: Falls - Risk of  Goal: *Absence of Falls  Description: Document Shanna Pyle Fall Risk and appropriate interventions in the flowsheet. 7/16/2020 1006 by Dnani Beltran  Outcome: Progressing Towards Goal  Note: Fall Risk Interventions:  Mobility Interventions: PT Consult for assist device competence, PT Consult for mobility concerns, OT consult for ADLs, Patient to call before getting OOB         Medication Interventions: Patient to call before getting OOB, Teach patient to arise slowly, Evaluate medications/consider consulting pharmacy    Elimination Interventions: Toileting schedule/hourly rounds, Call light in reach           7/16/2020 1002 by Danni Beltran  Outcome: Progressing Towards Goal  Note: Fall Risk Interventions:  Mobility Interventions: PT Consult for assist device competence, PT Consult for mobility concerns, OT consult for ADLs, Patient to call before getting OOB         Medication Interventions: Patient to call before getting OOB, Teach patient to arise slowly, Evaluate medications/consider consulting pharmacy    Elimination Interventions:  Toileting schedule/hourly rounds, Call light in reach              Problem: TIA/CVA Stroke: Day 2 Until Discharge  Goal: Activity/Safety  Outcome: Progressing Towards Goal  Goal: Diagnostic Test/Procedures  Outcome: Progressing Towards Goal  Goal: Nutrition/Diet  Outcome: Progressing Towards Goal  Goal: Discharge Planning  Outcome: Progressing Towards Goal  Goal: Medications  Outcome: Progressing Towards Goal  Goal: Respiratory  Outcome: Progressing Towards Goal  Goal: Treatments/Interventions/Procedures  Outcome: Progressing Towards Goal  Goal: Psychosocial  Outcome: Progressing Towards Goal  Goal: *Verbalizes anxiety and depression are reduced or absent  Outcome: Progressing Towards Goal  Goal: *Absence of aspiration  Outcome: Progressing Towards Goal  Goal: *Absence of deep venous thrombosis signs and symptoms(Stroke Metric)  Outcome: Progressing Towards Goal  Goal: *Optimal pain control at patient's stated goal  Outcome: Progressing Towards Goal  Goal: *Tolerating diet  Outcome: Progressing Towards Goal  Goal: *Ability to perform ADLs and demonstrates progressive mobility and function  Outcome: Progressing Towards Goal  Goal: *Stroke education continued(Stroke Metric)  Outcome: Progressing Towards Goal

## 2020-07-16 NOTE — PROGRESS NOTES
Bedside and Verbal shift change report given to Malvin White (oncoming nurse) by Cecilia Wiggins RN (offgoing nurse). Report included the following information SBAR, Kardex, Intake/Output, MAR, Recent Results, Med Rec Status, Cardiac Rhythm NSR, Alarm Parameters  and Dual Neuro Assessment.

## 2020-07-16 NOTE — PROGRESS NOTES
Aury Jacob, Keri Galeazzi, Pardeep, and Deanne Hemp Date: 7/14/2020      Subjective:     Patient demanding to be discharged now. He is very anxious and upset that things are not moving faster during this admission. I discussed with him that discharging him now would be unsafe as we were still weaning his IV BP med and still did not have a final recommendation from Neurosurgery and Hematology. He understood this and still wishes to go home despite the risk of morbidity or mortality. .       No current facility-administered medications for this encounter. Current Outpatient Medications   Medication Sig    atenoloL (TENORMIN) 100 mg tablet Take 1 Tab by mouth two (2) times a day.  NIFEdipine ER (PROCARDIA XL) 60 mg ER tablet Take 1 Tab by mouth daily.  amoxicillin-clavulanate (AUGMENTIN) 875-125 mg per tablet Take 1 Tab by mouth every twelve (12) hours.  ipratropium (Atrovent) 42 mcg (0.06 %) nasal spray 2 Sprays by Both Nostrils route two (2) times a day.  raNITIdine (ZANTAC) 150 mg tablet Take 150 mg by mouth two (2) times a day. PRN          Objective:     No data found. No intake/output data recorded. 07/15 0701 - 07/16 1900  In: 3521 [P.O.:120; I.V.:1125]  Out: 700 [Urine:700]            Data Review No results found for this or any previous visit (from the past 24 hour(s)). Assessment:     Active Problems:    Nontraumatic acute subdural hemorrhage ([facility] [address] (7/14/2020)        Plan:     He will sign out AMA. I will call he and his wife when they get home to discuss an outpatient plan. He understands that I do not feel is safe for him to go home yet he is willing to accept the risk and go anyway against my advice.

## 2020-07-16 NOTE — PROGRESS NOTES
Problem: Falls - Risk of  Goal: *Absence of Falls  Description: Document Tyson Amado Fall Risk and appropriate interventions in the flowsheet. Outcome: Progressing Towards Goal  Note: Fall Risk Interventions:  Mobility Interventions: PT Consult for assist device competence, PT Consult for mobility concerns, OT consult for ADLs, Patient to call before getting OOB         Medication Interventions: Patient to call before getting OOB, Teach patient to arise slowly, Evaluate medications/consider consulting pharmacy    Elimination Interventions: Toileting schedule/hourly rounds, Call light in reach              Problem: Pressure Injury - Risk of  Goal: *Prevention of pressure injury  Description: Document Tr Scale and appropriate interventions in the flowsheet. Outcome: Progressing Towards Goal  Note: Pressure Injury Interventions:             Activity Interventions: PT/OT evaluation, Pressure redistribution bed/mattress(bed type), Increase time out of bed         Nutrition Interventions: Offer support with meals,snacks and hydration, Document food/fluid/supplement intake                     Problem: TIA/CVA Stroke: Day 2 Until Discharge  Goal: Activity/Safety  Outcome: Progressing Towards Goal  Goal: Diagnostic Test/Procedures  Outcome: Progressing Towards Goal  Goal: Nutrition/Diet  Outcome: Progressing Towards Goal  Goal: Discharge Planning  Outcome: Progressing Towards Goal  Goal: Medications  Outcome: Progressing Towards Goal  Goal: Respiratory  Outcome: Progressing Towards Goal  Goal: Treatments/Interventions/Procedures  Outcome: Progressing Towards Goal  Goal: Psychosocial  Outcome: Progressing Towards Goal  Goal: *Verbalizes anxiety and depression are reduced or absent  Outcome: Progressing Towards Goal  Goal: *Absence of aspiration  Outcome: Progressing Towards Goal  Goal: *Absence of deep venous thrombosis signs and symptoms(Stroke Metric)  Outcome: Progressing Towards Goal  Goal: *Optimal pain control at patient's stated goal  Outcome: Progressing Towards Goal  Goal: *Tolerating diet  Outcome: Progressing Towards Goal  Goal: *Ability to perform ADLs and demonstrates progressive mobility and function  Outcome: Progressing Towards Goal  Goal: *Stroke education continued(Stroke Metric)  Outcome: Progressing Towards Goal

## 2020-07-20 LAB
BACKGROUND: 489207: NORMAL
DIRECTOR REVIEW: 489204: NORMAL
JAK2 P.V617F BLD/T QL: NORMAL

## 2020-07-21 ENCOUNTER — DOCUMENTATION ONLY (OUTPATIENT)
Dept: ONCOLOGY | Age: 70
End: 2020-07-21

## 2020-07-21 ENCOUNTER — TELEPHONE (OUTPATIENT)
Dept: ONCOLOGY | Age: 70
End: 2020-07-21

## 2020-07-21 LAB
BACKGROUND, BCR6T: NORMAL
INTERPRETATION: 480488: NORMAL
LAB DIRECTOR NAME PROVIDER: NORMAL
T(ABL1,BCR)B2A2/CONTROL BLD/T: 79.52 %
T(ABL1,BCR)B2A2/CONTROL BLD/T: NORMAL %
T(ABL1,BCR)B3A2/CONTROL BLD/T: 34.37 %
T(ABL1,BCR)E1A2/CONTROL BLD/T: 0.05 %

## 2020-07-21 NOTE — TELEPHONE ENCOUNTER
Called patient and confirmed x2 identifiers   Informed patient MD Ghazal Yap with Hematology wished to see patient as soon as possible to discuss an underlying blood condition that can cause bleeding/clotting     Patient verbalized frustration and questioned why information couldn't be provided over a telephone call   Informed patient that with the amount of labs performed, face-to-face appointment would be able to address all questions and discuss results appropriately   Patient declined to make appointment and stated would see PCP MD Sherry Bear this week first and would then decide wether or not he needed to be seen by Hematology     No new questions at this time

## 2020-07-21 NOTE — PROGRESS NOTES
Called to go over results   Patient was sleeping and his spouse stated she will give him a message    His labs show CML and I will forward this to Dr. Sherry Bear  We have encourage him to follow up - virtually if that helps but he wants to hold off as he stated during his prior conversations with nursing staff

## 2020-08-03 NOTE — DISCHARGE SUMMARY
Physician Discharge Summary     Patient ID:  Umm Field  [id number]  82 y.o.  1950    Admit date: 7/14/2020    Discharge date and time: 8/3/2020    Admission Diagnoses: ICH (intracerebral hemorrhage) ([facility] [address] [I61.9]    Discharge Diagnoses:  Principal Diagnosis Nontraumatic acute subdural hemorrhage ([facility] [address]                                            Principal Problem:    Nontraumatic acute subdural hemorrhage ([facility] [address] (7/14/2020)    Active Problems:    Essential hypertension (12/8/2015)           Hospital Course: Patient admitted to the ICU with [address] and hypertensive emergency having experienced the worst headache of his life. He also was found to have new thrombocytosis and leukocytosis from 2 years ago. No signs of infection evident. Placed on Cardene drip for BP control and seen by Neurosurgery and Neuro interventionalist. Had a cerebral angiogram which did not reveal obvious source of hemorrhage and no aneurysms. Was transferred to floor and Hematology consult was made. Labwork was sent and workup ensued. Patient became frustrated with the hospital stay and decided to leave AMA. He understood that he was putting himself in peril for further medical consequences or even death. I did send in Nifedipine ER 60mg daily to his pharmacy and encouraged an ASAP appt with his wife's oncologist- Dr Radha Beaver who he is willing to see. PCP: DARA Alvarez    Consults: Pulmonary/Intensive care and Neurolsurgery        Discharge Exam:  Visit Vitals  /83   Pulse 73   Temp 99.1 °F (37.3 °C)   Resp 17   Ht 5' 11\" (1.803 m)   Wt 225 lb 1.4 oz (102.1 kg)   SpO2 94%   BMI 30.96 kg/m²     Lungs: clear to auscultation bilaterally  Heart: regular rate and rhythm, S1, S2 normal, no murmur, click, rub or gallop  Abdomen: soft, non-tender.  Bowel sounds normal. No masses,  no organomegaly  Neurologic: Grossly normal    Disposition: left AMA     Patient Instructions:   Cannot display discharge medications since this patient is not currently admitted. Activity: Activity as tolerated  Diet: Cardiac Diet  Wound Care: None needed    No orders of the defined types were placed in this encounter.          Signed:  Gina Golden MD  8/3/2020  11:38 AM

## 2021-03-22 PROBLEM — C92.10 CML (CHRONIC MYELOCYTIC LEUKEMIA) (HCC): Status: ACTIVE | Noted: 2021-03-22

## 2022-03-17 ENCOUNTER — HOSPITAL ENCOUNTER (OUTPATIENT)
Dept: GENERAL RADIOLOGY | Age: 72
Discharge: HOME | End: 2022-03-17
Attending: FAMILY MEDICINE
Payer: MEDICARE

## 2022-03-17 DIAGNOSIS — R05.9 COUGH: ICD-10-CM

## 2022-03-17 PROCEDURE — 71046 X-RAY EXAM CHEST 2 VIEWS: CPT

## 2022-03-19 PROBLEM — I62.01 NONTRAUMATIC ACUTE SUBDURAL HEMORRHAGE (HCC): Status: ACTIVE | Noted: 2020-07-14

## 2022-03-19 PROBLEM — C92.10 CML (CHRONIC MYELOCYTIC LEUKEMIA) (HCC): Status: ACTIVE | Noted: 2021-03-22

## 2023-03-21 NOTE — PROGRESS NOTES
Aury Awan, Lauren Cain, and Coralee Homans Date: 7/14/2020      Subjective:     Patient awake and alert. BP with reasonable control. Start oral Nifedipine XR and start Cardene weaning today. .       Current Facility-Administered Medications   Medication Dose Route Frequency    NIFEdipine ER (PROCARDIA XL) tablet 30 mg  30 mg Oral DAILY    butalbital-acetaminophen-caffeine (FIORICET, ESGIC) -40 mg per tablet 1-2 Tab  1-2 Tab Oral Q6H PRN    metoclopramide HCl (REGLAN) injection 5 mg  5 mg IntraVENous Q6H PRN    labetaloL (NORMODYNE;TRANDATE) injection 20 mg  20 mg IntraVENous Q4H PRN    Or    hydrALAZINE (APRESOLINE) 20 mg/mL injection 20 mg  20 mg IntraVENous Q4H PRN    niCARdipine (CARDENE) 25 mg in 0.9% sodium chloride 250 mL infusion  0-15 mg/hr IntraVENous TITRATE    sodium chloride (NS) flush 5-40 mL  5-40 mL IntraVENous Q8H    sodium chloride (NS) flush 5-40 mL  5-40 mL IntraVENous PRN    ondansetron (ZOFRAN) injection 4 mg  4 mg IntraVENous Q4H PRN    acetaminophen (TYLENOL) tablet 650 mg  650 mg Oral Q4H PRN    Or    acetaminophen (TYLENOL) solution 650 mg  650 mg Per NG tube Q4H PRN    Or    acetaminophen (TYLENOL) suppository 650 mg  650 mg Rectal Q4H PRN    0.9% sodium chloride infusion  75 mL/hr IntraVENous CONTINUOUS    acetaminophen (TYLENOL) tablet 650 mg  650 mg Oral Q6H PRN          Objective:     Patient Vitals for the past 8 hrs:   BP Temp Pulse Resp SpO2 Weight   07/16/20 0613 146/81  72      07/16/20 0200 121/59 98.8 °F (37.1 °C) 81 17 95 % 225 lb 1.4 oz (102.1 kg)   07/16/20 0038 143/73  67      07/15/20 2326 146/80 98.8 °F (37.1 °C) 75 13 96 % 225 lb 5 oz (102.2 kg)     07/15 1901 - 07/16 0700  In: 225 [I.V.:225]  Out: -   07/14 0701 - 07/15 1900  In: 2512.8 [P.O.:320;  I.V.:2192.8]  Out: 1800 [Urine:1800]    Physical Exam: Lungs: clear to auscultation bilaterally  Heart: regular rate and rhythm, S1, S2 normal, no murmur, click, rub or gallop  Abdomen: soft, non-tender. Bowel sounds normal. No masses,  no organomegaly        Data Review   Recent Results (from the past 24 hour(s))   IRON PROFILE    Collection Time: 07/15/20  1:49 PM   Result Value Ref Range    Iron 130 35 - 150 ug/dL    TIBC 232 (L) 250 - 450 ug/dL    Iron % saturation 56 (H) 20 - 50 %   FERRITIN    Collection Time: 07/15/20  1:49 PM   Result Value Ref Range    Ferritin 435 (H) 26 - 388 NG/ML   LD    Collection Time: 07/15/20  1:49 PM   Result Value Ref Range     (H) 85 - 241 U/L   FACTOR VIII    Collection Time: 07/15/20  3:26 PM   Result Value Ref Range    Factor VIII 109 80 - 200 %   SAMPLES BEING HELD    Collection Time: 07/15/20  3:26 PM   Result Value Ref Range    SAMPLES BEING HELD 1 BLUE     COMMENT        Add-on orders for these samples will be processed based on acceptable specimen integrity and analyte stability, which may vary by analyte. Assessment:     Active Problems:    Nontraumatic acute subdural hemorrhage ([facility] [address] (7/14/2020)        Plan:     1) Start transition to oral BP meds  2) Appreciate Neurosurg and Hematology help. 3) Refused blood draw this AM, will try again soon.    4) U/S abd results pending Yes

## 2023-05-17 RX ORDER — AMOXICILLIN AND CLAVULANATE POTASSIUM 875; 125 MG/1; MG/1
1 TABLET, FILM COATED ORAL EVERY 12 HOURS
COMMUNITY
Start: 2022-10-24

## 2023-05-17 RX ORDER — TEMAZEPAM 15 MG/1
15 CAPSULE ORAL
COMMUNITY
Start: 2020-07-23

## 2023-05-17 RX ORDER — RANITIDINE 150 MG/1
150 TABLET ORAL 2 TIMES DAILY
COMMUNITY
End: 2023-07-12 | Stop reason: ALTCHOICE

## 2023-05-17 RX ORDER — DILTIAZEM HYDROCHLORIDE 180 MG/1
1 CAPSULE, EXTENDED RELEASE ORAL DAILY
COMMUNITY
Start: 2023-03-07

## 2023-05-17 RX ORDER — IPRATROPIUM BROMIDE 42 UG/1
2 SPRAY, METERED NASAL 2 TIMES DAILY
COMMUNITY
Start: 2020-03-12

## 2023-07-12 PROBLEM — D47.3 ESSENTIAL (HEMORRHAGIC) THROMBOCYTHEMIA (HCC): Status: ACTIVE | Noted: 2023-07-12

## 2023-07-14 PROBLEM — C92.10 CHRONIC MYELOID LEUKEMIA, BCR/ABL-POSITIVE, NOT HAVING ACHIEVED REMISSION (HCC): Status: ACTIVE | Noted: 2021-03-22

## 2024-03-01 ENCOUNTER — HOSPITAL ENCOUNTER (OUTPATIENT)
Facility: HOSPITAL | Age: 74
End: 2024-03-01
Attending: INTERNAL MEDICINE
Payer: MEDICARE

## 2024-03-01 DIAGNOSIS — R51.9 FACIAL PAIN: ICD-10-CM

## 2024-03-01 DIAGNOSIS — I62.02 NONTRAUMATIC SUBACUTE SUBDURAL HEMORRHAGE (HCC): ICD-10-CM

## 2024-03-01 PROCEDURE — A9579 GAD-BASE MR CONTRAST NOS,1ML: HCPCS | Performed by: INTERNAL MEDICINE

## 2024-03-01 PROCEDURE — 70553 MRI BRAIN STEM W/O & W/DYE: CPT

## 2024-03-01 PROCEDURE — 6360000004 HC RX CONTRAST MEDICATION: Performed by: INTERNAL MEDICINE

## 2024-03-01 RX ADMIN — GADOTERIDOL 20 ML: 279.3 INJECTION, SOLUTION INTRAVENOUS at 12:47

## 2024-05-07 ENCOUNTER — HOSPITAL ENCOUNTER (OUTPATIENT)
Facility: HOSPITAL | Age: 74
Discharge: HOME | End: 2024-05-10
Attending: INTERNAL MEDICINE
Payer: MEDICARE

## 2024-05-07 DIAGNOSIS — D32.0 BENIGN NEOPLASM OF CEREBRAL MENINGES (HCC): ICD-10-CM

## 2024-05-07 PROCEDURE — 70553 MRI BRAIN STEM W/O & W/DYE: CPT

## 2024-05-07 PROCEDURE — 6360000004 HC RX CONTRAST MEDICATION: Performed by: INTERNAL MEDICINE

## 2024-05-07 PROCEDURE — A9579 GAD-BASE MR CONTRAST NOS,1ML: HCPCS | Performed by: INTERNAL MEDICINE

## 2024-05-07 RX ADMIN — GADOTERIDOL 20 ML: 279.3 INJECTION, SOLUTION INTRAVENOUS at 12:58

## 2024-07-15 ENCOUNTER — TELEPHONE (OUTPATIENT)
Age: 74
End: 2024-07-15

## 2024-07-15 NOTE — TELEPHONE ENCOUNTER
----- Message from Alfonso Ochoa Jr., MD sent at 7/10/2024 11:49 AM EDT -----  Regarding: NP appointment  Please call the patient and inform him that he needs to see neurosurgery and not neurology and so cancel his appointment. His PCP has sent a referral to see neurosurgery for the changes in his brain MRI.

## 2024-07-15 NOTE — TELEPHONE ENCOUNTER
Spoke with patient,  verified, per Dr. Ochoa, that he needs to see neurosurgery and not neurology. PCP sent referral to see neurosurgery for the changes in his brain MRI. Informed patient can see the referral in the system and will print off and place in the mail. Patient stated that would be great and verbalized understanding.

## 2025-04-22 NOTE — PROGRESS NOTES
Neurosurgery Progress Note  Amado Fritz ACNP-BC  [phone]        Admit Date: 2020   LOS: 1 day        Daily Progress Note: 7/15/2020      Subjective: The patient reports he had a sudden onset of acute headache yesterday. Denies nausea, vomiting, any recent trauma. States he had some photophobia in the left eye. He denies taking any blood thinning medication, including aspirin. States the only medicine he takes is a medication for his blood pressure and vitamins. He presented to the ER at an outside hospital in [place], [place]. His head CT there showed a trace left subdural hematoma and some extra-axial hemorrhage in the posterior foramen magnum. There was also mention of a left anterior clinoid process meningioma. He transferred to [facility] for further evaluation. He underwent a diagnostic angiogram this morning to look for an aneurysm due to the patient history of \"sudeen onset of worst headache of my life. \" The angiogram did not reveal any aneurysms. 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 The patient is neurologically intact. He just returned to the ICU from his angiogram. Of note, the patient has significant thrombocytosis and leukocytosis. He states his brother called him one day to tell him he had some genetic blood disorder inherited from their dad where he has to get blood letting done, but he does not know what it is called. He sees his PCP and gets physicals every 2 years. Looking over his last blood tests in the last few years, there are no abnormalities there so this is a new finding. Denies numbness, tingling, chest pain, leg pain, nausea, vomiting, difficulty swallowing, and dyspnea.        Objective:     Vital signs  Temp (24hrs), Av.4 °F (36.9 °C), Min:98.3 °F (36.8 °C), Max:98.4 °F (36.9 °C)   07/15 0701 - 07/15 1900  In: 281.3 [I.V.:281.3]  Out: -   1901 - 07/15 The form received must be signed and completed by the clinician.    The form was routed to the appropriate clinician via [employer] Pool with details and instructions.  Encounter closed.    0700  In: 1492.8 [P.O.:200; I.V.:1292.8]  Out: 1100 [Urine:1100]    Visit Vitals  /84   Pulse (!) 57   Temp 98.4 °F (36.9 °C)   Resp 17   Ht 5' 11\" (1.803 m)   Wt 99.4 kg (219 lb 2.2 oz)   SpO2 90%   BMI 30.56 kg/m²    O2 Flow Rate (L/min): 2 l/min O2 Device: Room air     Pain control  Pain Assessment  Pain Scale 1: Numeric (0 - 10)  Pain Intensity 1: 2  Pain Onset 1: got worse during MRI  Pain Location 1: Head  Pain Orientation 1: Left, Upper(behind left eye)  Pain Description 1: Throbbing, Constant  Pain Intervention(s) 1: Medication (see MAR)    PT/OT  Gait                 Physical Exam:  Gen:NAD. Neuro: A&Ox3. Follows commands. Speech clear. Affect normal.  PERRL. EOMI. Face symmetric. Tongue midline. MARTINEZ. Strength 5/5 in UE and LE BL. Negative drift. Gait deferred. CT head without contrast on 07/14/2020 shows suspect trace acute left tentorial subdural hemorrhage, and possible extra-axial hemorrhage in the posterior foramen magnum. No evidence of acute subarachnoid hemorrhage. Partly calcified extra-axial mass left anterior clinoid process likely meningioma. Possible second, much smaller right parietal convexity meningioma. CTA head/neck on 07/14/2020 shows no acute large vessel occlusion or dissection. Subtle asymmetry in vascularity at the junction of the V3-V4 right vertebral artery segment at as it enters the foramen magnum, where there is also a small amount of extra-axial hemorrhage suspected. However, there is venous contamination and not this may be normal vascularity. Patient scheduled for conventional angiogram tomorrow to evaluate for possible dural AV fistula. Tortuous mildly dilated left V4 vertebral artery segment. There is also an extra-axial enhancing and partly calcified mass with stippled internal vascularity left anterior clinoid more likely representing  meningioma. No intracranial aneurysm.     24 hour results:    Recent Results (from the past 24 hour(s))   METABOLIC PANEL, COMPREHENSIVE    Collection Time: 07/14/20 10:34 PM   Result Value Ref Range    Sodium 139 136 - 145 mmol/L    Potassium 3.3 (L) 3.5 - 5.1 mmol/L    Chloride 106 97 - 108 mmol/L    CO2 26 21 - 32 mmol/L    Anion gap 7 5 - 15 mmol/L    Glucose 121 (H) 65 - 100 mg/dL    BUN 18 6 - 20 MG/DL    Creatinine 1.18 0.70 - 1.30 MG/DL    BUN/Creatinine ratio 15 12 - 20      GFR est AA >60 >60 ml/min/1.73m2    GFR est non-AA >60 >60 ml/min/1.73m2    Calcium 8.5 8.5 - 10.1 MG/DL    Bilirubin, total 0.5 0.2 - 1.0 MG/DL    ALT (SGPT) 36 12 - 78 U/L    AST (SGOT) 16 15 - 37 U/L    Alk. phosphatase 63 45 - 117 U/L    Protein, total 7.0 6.4 - 8.2 g/dL    Albumin 4.0 3.5 - 5.0 g/dL    Globulin 3.0 2.0 - 4.0 g/dL    A-G Ratio 1.3 1.1 - 2.2     MAGNESIUM    Collection Time: 07/14/20 10:34 PM   Result Value Ref Range    Magnesium 2.0 1.6 - 2.4 mg/dL   PHOSPHORUS    Collection Time: 07/14/20 10:34 PM   Result Value Ref Range    Phosphorus 3.6 2.6 - 4.7 MG/DL   CBC WITH AUTOMATED DIFF    Collection Time: 07/14/20 10:34 PM   Result Value Ref Range    WBC 32.5 (H) 4.1 - 11.1 K/uL    RBC 4.59 4. 10 - 5.70 M/uL    HGB 13.3 12.1 - 17.0 g/dL    HCT 40.4 36.6 - 50.3 %    MCV 88.0 80.0 - 99.0 FL    MCH 29.0 26.0 - 34.0 PG    MCHC 32.9 30.0 - 36.5 g/dL    RDW 14.9 (H) 11.5 - 14.5 %    PLATELET 6,530 (HH) 040 - 400 K/uL    MPV 10.5 8.9 - 12.9 FL    NRBC 0.0 0  WBC    ABSOLUTE NRBC 0.00 0.00 - 0.01 K/uL    NEUTROPHILS 73 32 - 75 %    BAND NEUTROPHILS 4 0 - 6 %    LYMPHOCYTES 8 (L) 12 - 49 %    MONOCYTES 5 5 - 13 %    EOSINOPHILS 3 0 - 7 %    BASOPHILS 5 (H) 0 - 1 %    METAMYELOCYTES 1 (H) 0 %    MYELOCYTES 1 (H) 0 %    IMMATURE GRANULOCYTES 0 %    ABS. NEUTROPHILS 25.0 (H) 1.8 - 8.0 K/UL    ABS. LYMPHOCYTES 2.6 0.8 - 3.5 K/UL    ABS. MONOCYTES 1.6 (H) 0.0 - 1.0 K/UL    ABS. EOSINOPHILS 1.0 (H) 0.0 - 0.4 K/UL    ABS. BASOPHILS 1.6 (H) 0.0 - 0.1 K/UL    ABS. IMM.  GRANS. 0.0 K/UL    DF MANUAL      PLATELET COMMENTS Large Platelets      RBC COMMENTS NORMOCYTIC, NORMOCHROMIC      Pathologist review        Pathologic examination results can be viewed in [facility] Chart Review under the Pathology tab. PROTHROMBIN TIME + INR    Collection Time: 07/14/20 10:34 PM   Result Value Ref Range    INR 1.4 (H) 0.9 - 1.1      Prothrombin time 13.9 (H) 9.0 - 11.1 sec   PTT    Collection Time: 07/14/20 10:34 PM   Result Value Ref Range    aPTT 30.4 22.1 - 32.0 sec    aPTT, therapeutic range     58.0 - 77.0 SECS   LIPID PANEL    Collection Time: 07/14/20 10:34 PM   Result Value Ref Range    LIPID PROFILE          Cholesterol, total 182 <200 MG/DL    Triglyceride 212 (H) <150 MG/DL    HDL Cholesterol 33 MG/DL    LDL, calculated 106.6 (H) 0 - 100 MG/DL    VLDL, calculated 42.4 MG/DL    CHOL/HDL Ratio 5.5 (H) 0.0 - 5.0     PERIPHERAL SMEAR    Collection Time: 07/14/20 10:34 PM   Result Value Ref Range    PERIPHERAL SMEAR        Pathologic examination results can be viewed in [facility] Chart Review under the Pathology tab.    FERRITIN    Collection Time: 07/14/20 10:34 PM   Result Value Ref Range    Ferritin 422 (H) 26 - 524 NG/ML   METABOLIC PANEL, BASIC    Collection Time: 07/15/20  4:54 AM   Result Value Ref Range    Sodium 140 136 - 145 mmol/L    Potassium 3.7 3.5 - 5.1 mmol/L    Chloride 109 (H) 97 - 108 mmol/L    CO2 23 21 - 32 mmol/L    Anion gap 8 5 - 15 mmol/L    Glucose 108 (H) 65 - 100 mg/dL    BUN 15 6 - 20 MG/DL    Creatinine 0.91 0.70 - 1.30 MG/DL    BUN/Creatinine ratio 16 12 - 20      GFR est AA >60 >60 ml/min/1.73m2    GFR est non-AA >60 >60 ml/min/1.73m2    Calcium 8.4 (L) 8.5 - 10.1 MG/DL   MAGNESIUM    Collection Time: 07/15/20  4:54 AM   Result Value Ref Range    Magnesium 2.0 1.6 - 2.4 mg/dL   PHOSPHORUS    Collection Time: 07/15/20  4:54 AM   Result Value Ref Range    Phosphorus 3.0 2.6 - 4.7 MG/DL   CBC WITH AUTOMATED DIFF    Collection Time: 07/15/20  4:58 AM   Result Value Ref Range    WBC 33.9 (H) 4.1 - 11.1 K/uL    RBC 4.42 4.10 - 5.70 M/uL    HGB 12.8 12.1 - 17.0 g/dL    HCT 38.5 36.6 - 50.3 %    MCV 87.1 80.0 - 99.0 FL    MCH 29.0 26.0 - 34.0 PG    MCHC 33.2 30.0 - 36.5 g/dL    RDW 15.1 (H) 11.5 - 14.5 %    PLATELET 9,974 (HH) 448 - 400 K/uL    MPV 10.3 8.9 - 12.9 FL    NRBC 0.0 0  WBC    ABSOLUTE NRBC 0.00 0.00 - 0.01 K/uL    NEUTROPHILS 65 32 - 75 %    BAND NEUTROPHILS 6 0 - 6 %    LYMPHOCYTES 10 (L) 12 - 49 %    MONOCYTES 5 5 - 13 %    EOSINOPHILS 1 0 - 7 %    BASOPHILS 9 (H) 0 - 1 %    MYELOCYTES 4 (H) 0 %    IMMATURE GRANULOCYTES 0 %    ABS. NEUTROPHILS 24.1 (H) 1.8 - 8.0 K/UL    ABS. LYMPHOCYTES 3.4 0.8 - 3.5 K/UL    ABS. MONOCYTES 1.7 (H) 0.0 - 1.0 K/UL    ABS. EOSINOPHILS 0.3 0.0 - 0.4 K/UL    ABS. BASOPHILS 3.1 (H) 0.0 - 0.1 K/UL    ABS. IMM. GRANS. 0.0 K/UL    DF MANUAL      PLATELET COMMENTS Large Platelets      RBC COMMENTS ANISOCYTOSIS  1+              Assessment:     Active Problems:    Nontraumatic acute subdural hemorrhage ([facility], [address] (7/14/2020)        Plan:   1. Nontraumatic SDH   - Angio today to rule out fistula   - no surgical intervention   - ok to eat from NSGY standpoint   - ok transfer to nstu from NSGY standpoint   - Likely need to consider repeat angio study in 6 weeks since no source identified of spontaneous intracranial bleed  2. Thrombocytosis   - Consult hematology  3. Leukocytosis   - consult hematology  4. HTN   - patient on atenolol at home. HR running below 60 so this medication has been held. Will defer PO BP med to internal medicine   - SBP<140   - hydralazine/ labetalol PRN    Activity: up with assist  DVT ppx: SCDs  Dispo: tbd    Plan d/w Dr. Aneta Guzman, ICU nurse.       Eligio Cai NP